# Patient Record
Sex: FEMALE | Race: WHITE | NOT HISPANIC OR LATINO | URBAN - METROPOLITAN AREA
[De-identification: names, ages, dates, MRNs, and addresses within clinical notes are randomized per-mention and may not be internally consistent; named-entity substitution may affect disease eponyms.]

---

## 2023-02-09 ENCOUNTER — OFFICE VISIT (OUTPATIENT)
Dept: FAMILY MEDICINE CLINIC | Facility: CLINIC | Age: 63
End: 2023-02-09

## 2023-02-09 ENCOUNTER — TELEPHONE (OUTPATIENT)
Dept: FAMILY MEDICINE CLINIC | Facility: CLINIC | Age: 63
End: 2023-02-09

## 2023-02-09 VITALS
TEMPERATURE: 97.9 F | SYSTOLIC BLOOD PRESSURE: 130 MMHG | DIASTOLIC BLOOD PRESSURE: 72 MMHG | WEIGHT: 122 LBS | HEIGHT: 61 IN | HEART RATE: 99 BPM | OXYGEN SATURATION: 98 % | BODY MASS INDEX: 23.03 KG/M2

## 2023-02-09 DIAGNOSIS — M79.672 ACUTE PAIN OF LEFT FOOT: Primary | ICD-10-CM

## 2023-02-09 DIAGNOSIS — Z74.8 ASSISTANCE NEEDED WITH TRANSPORTATION: ICD-10-CM

## 2023-02-09 DIAGNOSIS — M25.469 SUPRAPATELLAR EFFUSION OF KNEE: ICD-10-CM

## 2023-02-09 DIAGNOSIS — G89.4 CHRONIC PAIN SYNDROME: ICD-10-CM

## 2023-02-09 DIAGNOSIS — M79.89 SWELLING OF LEFT FOOT: ICD-10-CM

## 2023-02-09 DIAGNOSIS — M25.561 ACUTE PAIN OF RIGHT KNEE: ICD-10-CM

## 2023-02-09 DIAGNOSIS — M51.36 DDD (DEGENERATIVE DISC DISEASE), LUMBAR: ICD-10-CM

## 2023-02-09 PROBLEM — M51.369 DDD (DEGENERATIVE DISC DISEASE), LUMBAR: Status: ACTIVE | Noted: 2023-02-09

## 2023-02-09 PROBLEM — G89.29 CHRONIC PAIN: Status: ACTIVE | Noted: 2023-02-09

## 2023-02-09 RX ORDER — MORPHINE SULFATE 30 MG/1
30 TABLET, FILM COATED, EXTENDED RELEASE ORAL EVERY 12 HOURS
COMMUNITY
Start: 2023-01-28

## 2023-02-09 RX ORDER — SENNOSIDES 8.6 MG
1 CAPSULE ORAL EVERY 6 HOURS
COMMUNITY
Start: 2022-11-10

## 2023-02-09 RX ORDER — OMEPRAZOLE 20 MG/1
1 CAPSULE, DELAYED RELEASE ORAL DAILY
COMMUNITY
Start: 2022-11-10

## 2023-02-09 NOTE — PROGRESS NOTES
[facility] PRIMARY CARE    NAME: Maeve Reyes  AGE: 58 y o  SEX: female  : 1960     DATE: 2023     Assessment and Plan:     Problem List Items Addressed This Visit    None  Visit Diagnoses     Annual physical exam    -  Primary          Immunizations and preventive care screenings were discussed with patient today  Appropriate education was printed on patient's after visit summary  Counseling:  {Annual Physical; Counselin}         No follow-ups on file  Chief Complaint:     Chief Complaint   Patient presents with   • Establish Care     Patient is in the office to Establish Car with Dr Jennifer Sow  Last PCP Dr Gomez Delaney still has different PCP listed on [employer] insurance - was not ever available for contact ahead of appmt to notify  Stated Concerns - "my legs are bothering me" Pt feels pain, especially in her left foot, states that L foot and lower leg is swollen and the front lower part of foot does not flex  R leg is similar but R knee "does not flex"  Pt demonstrated ROM  Pt went tp [address]  History of Present Illness:     Adult Annual Physical   Patient is a 58year old female who presents today as a new patient a comprehensive physical exam     Her previous PCP= Dr Emery Ochoa  There are no records for review at time of this visit  Patient speaks [place] as her primary language  She came alone to today's visit   services offered  The patient reports pain and swelling in both of her legs  She states that she was hospitalized at [facility] recently for this same issue  She is not certain as to what the diagnosis was  She has been on disability for 15 years for   She follows with Dr Mala Richardson (?sp) at [facility]       Colon cancer screen  Cervical cancer screen   Mammogram    There is no immunization history on file for this patient  She declines flu vaccine, pneumococcal vaccine, shingles vaccine and covid 19 vaccine  Diet and Physical Activity  Diet/Nutrition: {annual physical; diet:}  Exercise: {annual physical; exercise:2102}  Depression Screening  PHQ-2/9 Depression Screening    Little interest or pleasure in doing things: 0 - not at all  Feeling down, depressed, or hopeless: 0 - not at all  PHQ-2 Score: 0  PHQ-2 Interpretation: Negative depression screen       General Health  Sleep: {annual physical; sleep:2102}  Hearing: {annual physical; hearin}  Vision: {annual physical; vision:}  Dental: {annual physical; dental:}  /GYN Health  Patient is: {Menopause:21323}  Last menstrual period: ***  Contraceptive method: {contraceptive options:}  Review of Systems:     Review of Systems   Past Medical History:     History reviewed  No pertinent past medical history  Past Surgical History:     History reviewed  No pertinent surgical history     Social History:     Social History     Socioeconomic History   • Marital status:      Spouse name: None   • Number of children: None   • Years of education: None   • Highest education level: None   Occupational History   • None   Tobacco Use   • Smoking status: Every Day     Packs/day: 0 50     Years: 40 00     Pack years: 20 00     Types: Cigarettes     Start date: 1980   • Smokeless tobacco: Never   Vaping Use   • Vaping Use: Never used   Substance and Sexual Activity   • Alcohol use: Never   • Drug use: Never   • Sexual activity: Not Currently   Other Topics Concern   • None   Social History Narrative   • None     Social Determinants of Health     Financial Resource Strain: Not on file   Food Insecurity: Not on file   Transportation Needs: Not on file   Physical Activity: Not on file   Stress: Not on file   Social Connections: Not on file   Intimate Partner Violence: Not on file   Housing Stability: Not on file      Family History:     Family History   Problem Relation Age of Onset   • Gout Father         Passsed away at 80, had gout, but only in old age  Current Medications:     Current Outpatient Medications   Medication Sig Dispense Refill   • acetaminophen (TYLENOL) 650 mg CR tablet Take 1 tablet by mouth every 6 (six) hours     • morphine (MS CONTIN) 30 mg 12 hr tablet Take 30 mg by mouth every 12 (twelve) hours     • omeprazole (PriLOSEC) 20 mg delayed release capsule Take 1 capsule by mouth in the morning       No current facility-administered medications for this visit        Allergies:     No Known Allergies   Physical Exam:     /72 (BP Location: Left arm, Patient Position: Sitting, Cuff Size: Standard)   Pulse 99   Temp 97 9 °F (36 6 °C) (Tympanic)   Ht 5' 1" (1 549 m)   Wt 55 3 kg (122 lb)   LMP  (LMP Unknown)   SpO2 98%   BMI 23 05 kg/m²     Physical Exam     Arturo Perry DO  [address]

## 2023-02-09 NOTE — PROGRESS NOTES
Name: Linda Sheppard      : 1960      MRN: [id number]  Encounter Provider: Loyd June DO  Encounter Date: 2023   Encounter department: [address] [place] [address]  Acute pain of left foot  Xray of left foot in hospital showed only plantar calcaneal spur otherwise negative for fracture  Still with pain and swelling of this foot  ? Rheumatologic etiology  Patient was referred at time of discharge to see rheumatology  We tried calling the rheumatologist office--Dr sexton --and they do not accept [employer] insurance  Will put referral in for [facility] rheum and ortho but unclear whether she will be able to find transportation for viist    She cannot drive nor does she have family nearby that can drive her  Referral to social work placed  2  DDD (degenerative disc disease), lumbar  Chronic pain in low back for which she follows with neurology and is on MS contin for this  3  Chronic pain syndrome    4  Acute pain of right knee  -     C-reactive protein; Future  -     CBC and differential; Future  -     Cyclic citrul peptide antibody, IgG; Future  -     DIAMOND Screen w/ Reflex to Titer/Pattern; Future  -     Lyme Antibody Profile with reflex to WB; Future  -     Uric acid; Future  -     Comprehensive metabolic panel; Future  -     C-reactive protein  -     CBC and differential  -     Cyclic citrul peptide antibody, IgG  -     DIAMOND Screen w/ Reflex to Titer/Pattern  -     Lyme Antibody Profile with reflex to WB  -     Uric acid  -     Comprehensive metabolic panel  Reviewed her hospital discharge records that were given to patient which included labs, ultrasound guided aspiration report, and discharge report  No actual imaging reports were available for my review  Will check additional labs  Needs to see ortho for this gastroc fluid collection and associated acute pain/swelling of right knee   May possibly need to see rheum for this but may have difficulty getting appt for this patient who cannot drive and has insurance that local independent rheumatologist does not accept  I put referrals in for [facility] and and rheum and for social work since she  does not drive  Does not appear to be cellulitis on exam as there is no erythema/warmth of either extremity though I did advised that if she develops worsening pain, erythema or warmth, should call/proceed to ED  She is agreeable  5  Suprapatellar effusion of knee  -     C-reactive protein; Future  -     CBC and differential; Future  -     Cyclic citrul peptide antibody, IgG; Future  -     DIAMOND Screen w/ Reflex to Titer/Pattern; Future  -     Lyme Antibody Profile with reflex to WB; Future  -     Uric acid; Future  -     Comprehensive metabolic panel; Future  -     C-reactive protein  -     CBC and differential  -     Cyclic citrul peptide antibody, IgG  -     DIAMOND Screen w/ Reflex to Titer/Pattern  -     Lyme Antibody Profile with reflex to WB  -     Uric acid  -     Comprehensive metabolic panel    6  Swelling of left foot  -     C-reactive protein; Future  -     CBC and differential; Future  -     Cyclic citrul peptide antibody, IgG; Future  -     DIAMOND Screen w/ Reflex to Titer/Pattern; Future  -     Lyme Antibody Profile with reflex to WB; Future  -     Uric acid; Future  -     Comprehensive metabolic panel; Future  -     C-reactive protein  -     CBC and differential  -     Cyclic citrul peptide antibody, IgG  -     DIAMOND Screen w/ Reflex to Titer/Pattern  -     Lyme Antibody Profile with reflex to WB  -     Uric acid  -     Comprehensive metabolic panel           Subjective     HPI   Patient is a 58year old female who presents today as a new patient to establish care/followup from recent hospital admission  Her previous PCP= Dr Elle Toribio  There are no records from his office for review at time of this visit  She thinks that the last time she was there was 2 years ago       Patient speaks [place] as her primary language  She came alone to today's visit   services offered  She declines  The patient reports pain and swelling in both feet and legs for the last month  Initially was in just the left foot, but now has spread to involve the right foot  She states that she was seen at [facility] initially, diagnosed with gout, given NSAIDS, and sent home  Then went back to [facility] and was admitted  She brought her discharge paperwork along which I reviewed  HPI notes that she came in with RIGHT lower extremity pain and redness for 2 days  Was then diagnosed with cellulitis  Venous duplex was negative for DVT  She had CT scan which showed moderate suprapatellar effusion  And fluid collection in medial gastroc muscle  Right knee was aspirated by ortho on 1/19/23 and cultures showed no growth  Blood cultures were negative  She also had xray of left foot  Xray negative for fracture  She was started on prednisone during admission with reported improvement in her pain  Discharged on doxy and prednisone and advised to f/u with rheumatologist  She reports that she felt much better while on steroids  Now has finished both and still having pain and swelling in both feet, left foot greater than right  Her right knee is also still bothering her  She also states that she has been running low grade fevers  CBC on 1/22/23 showed normal WBC at 7 3K  her H/H were low at 10 6/33 8 respectively  Platelets were elevated at 446K  CMP on 1/22/23 showed low sodium at 135, elevated glucose at 150  BUN and creatinine were normal    Her uric acid level on 1/18/23 was normal at 3 6  CRP elevated 17 9K  Aspiration of knee showed cloudy synovial fluid with 10,850 WBC and no crystals  Cultures of this aspirate were negative for infection  She has been on disability for 15 years for for "herniated disc pain in her back"   She follows with a neurologist Dr Remberto Kaur (?sp) at [facility]  Being prescribed MS contin  Review of Systems    Past Medical History:   Diagnosis Date   • Chronic low back pain    • DDD (degenerative disc disease), lumbar    • Perforated peptic ulcer ([address] )      Past Surgical History:   Procedure Laterality Date   • ABDOMINAL SURGERY  2004    for perforated peptic ulcer     Family History   Problem Relation Age of Onset   • Gout Father         Passsed away at 80, had gout, but only in old age  Social History     Socioeconomic History   • Marital status:      Spouse name: None   • Number of children: None   • Years of education: None   • Highest education level: None   Occupational History   • None   Tobacco Use   • Smoking status: Every Day     Packs/day: 0 50     Years: 40 00     Pack years: 20 00     Types: Cigarettes     Start date: 1/1/1980   • Smokeless tobacco: Never   Vaping Use   • Vaping Use: Never used   Substance and Sexual Activity   • Alcohol use: Never   • Drug use: Never   • Sexual activity: Not Currently   Other Topics Concern   • None   Social History Narrative    Recently moved to [place] [employer] from [place]    2 daughters, one in [place] and one in [place]    Lives alone with her cat     Social Determinants of Health     Financial Resource Strain: Not on file   Food Insecurity: Not on file   Transportation Needs: Not on file   Physical Activity: Not on file   Stress: Not on file   Social Connections: Not on file   Intimate Partner Violence: Not on file   Housing Stability: Not on file     Current Outpatient Medications on File Prior to Visit   Medication Sig   • acetaminophen (TYLENOL) 650 mg CR tablet Take 1 tablet by mouth every 6 (six) hours   • morphine (MS CONTIN) 30 mg 12 hr tablet Take 30 mg by mouth every 12 (twelve) hours   • omeprazole (PriLOSEC) 20 mg delayed release capsule Take 1 capsule by mouth in the morning     No Known Allergies    There is no immunization history on file for this patient      Objective     /72 (BP Location: Left arm, Patient Position: Sitting, Cuff Size: Standard)   Pulse 99   Temp 97 9 °F (36 6 °C) (Tympanic)   Ht 5' 1" (1 549 m)   Wt 55 3 kg (122 lb)   LMP  (LMP Unknown)   SpO2 98%   BMI 23 05 kg/m²     Physical Exam  Vitals and nursing note reviewed  Constitutional:       General: She is not in acute distress  Appearance: Normal appearance  She is not ill-appearing, toxic-appearing or diaphoretic  Comments: Ambulates with walker  HENT:      Head: Normocephalic and atraumatic  Mouth/Throat:      Mouth: Mucous membranes are moist       Pharynx: No oropharyngeal exudate or posterior oropharyngeal erythema  Eyes:      Conjunctiva/sclera: Conjunctivae normal    Cardiovascular:      Rate and Rhythm: Normal rate and regular rhythm  Pulses: Normal pulses  Heart sounds: No murmur heard  Comments: Dorsalis pedis pulses 2/4 bilateral lower extremities  Pulmonary:      Effort: Pulmonary effort is normal       Breath sounds: Normal breath sounds  Abdominal:      General: Abdomen is flat  Bowel sounds are normal  There is no distension  Palpations: Abdomen is soft  There is no mass  Tenderness: There is no abdominal tenderness  Musculoskeletal:      Cervical back: Normal range of motion and neck supple  Comments: Bilateral pedal edema  Negative Bhavya's bilaterally  Right knee with effusion and inability to actively flex knee   Lymphadenopathy:      Cervical: No cervical adenopathy  Skin:     General: Skin is warm and dry  Findings: No lesion or rash  Comments: Feet warm, dry with no open wounds  She has edema bilateral feet   Neurological:      General: No focal deficit present  Mental Status: She is alert     Psychiatric:         Mood and Affect: Mood normal        Oral Vanessa DO

## 2023-02-09 NOTE — PATIENT INSTRUCTIONS

## 2023-02-09 NOTE — TELEPHONE ENCOUNTER
PCP on [facility], patient stated that her daughter Efrain Cruz is aware to change the PCP on insurance [employer])  Patient stated that she is sure that her daughter knows, because her daughter stated this fact to her  I spoke with the patient's daughter 2/9/2023 @ 4:07PM and she stated that she called [facility] and they stated that as of today, the PCP is updated to Dr Nabil Emmanuel

## 2023-02-14 ENCOUNTER — TELEPHONE (OUTPATIENT)
Dept: FAMILY MEDICINE CLINIC | Facility: CLINIC | Age: 63
End: 2023-02-14

## 2023-02-14 NOTE — TELEPHONE ENCOUNTER
Neurology -     Patient called in with daughter on 3-way to have daughter assist her with translation  Daughter and patient received clarification on numerous OV instructions from the first OV, inclusive of lab testing and referrals  Patient and patient's daughter also agreed that they think that staying with Dr Bhavya Graves of [address] [place] [address] and [facility] is a best option, as patient has followed for 16 years  Patient and patient's daughter aware to call back with any further requests for clarification

## 2023-02-15 ENCOUNTER — HOSPITAL ENCOUNTER (OUTPATIENT)
Dept: RADIOLOGY | Facility: HOSPITAL | Age: 63
Discharge: HOME | End: 2023-02-15

## 2023-02-15 DIAGNOSIS — M54.10 RADICULOPATHY, UNSPECIFIED SPINAL REGION: ICD-10-CM

## 2023-02-15 DIAGNOSIS — G89.29 OTHER CHRONIC PAIN: ICD-10-CM

## 2023-02-17 ENCOUNTER — TELEPHONE (OUTPATIENT)
Dept: FAMILY MEDICINE CLINIC | Facility: CLINIC | Age: 63
End: 2023-02-17

## 2023-02-17 ENCOUNTER — TELEPHONE (OUTPATIENT)
Dept: OBGYN CLINIC | Facility: HOSPITAL | Age: 63
End: 2023-02-17

## 2023-02-17 LAB
ALBUMIN SERPL-MCNC: 3.9 G/DL (ref 3.6–5.1)
ALBUMIN/GLOB SERPL: 1.3 (CALC) (ref 1–2.5)
ALP SERPL-CCNC: 71 U/L (ref 37–153)
ALT SERPL-CCNC: 16 U/L (ref 6–29)
AST SERPL-CCNC: 19 U/L (ref 10–35)
B BURGDOR AB SER IA-ACNC: <0.9 INDEX
BASOPHILS # BLD AUTO: 22 CELLS/UL (ref 0–200)
BASOPHILS NFR BLD AUTO: 0.4 %
BILIRUB SERPL-MCNC: 0.3 MG/DL (ref 0.2–1.2)
BUN SERPL-MCNC: 13 MG/DL (ref 7–25)
BUN/CREAT SERPL: ABNORMAL (CALC) (ref 6–22)
CALCIUM SERPL-MCNC: 9.8 MG/DL (ref 8.6–10.4)
CCP IGG SERPL-ACNC: <16 UNITS
CHLORIDE SERPL-SCNC: 103 MMOL/L (ref 98–110)
CO2 SERPL-SCNC: 29 MMOL/L (ref 20–32)
CREAT SERPL-MCNC: 0.66 MG/DL (ref 0.5–1.05)
CRP SERPL-MCNC: 30.2 MG/L
EOSINOPHIL # BLD AUTO: 157 CELLS/UL (ref 15–500)
EOSINOPHIL NFR BLD AUTO: 2.8 %
ERYTHROCYTE [DISTWIDTH] IN BLOOD BY AUTOMATED COUNT: 12.1 % (ref 11–15)
GFR/BSA.PRED SERPLBLD CYS-BASED-ARV: 99 ML/MIN/1.73M2
GLOBULIN SER CALC-MCNC: 2.9 G/DL (CALC) (ref 1.9–3.7)
GLUCOSE SERPL-MCNC: 105 MG/DL (ref 65–99)
HCT VFR BLD AUTO: 36.5 % (ref 35–45)
HGB BLD-MCNC: 11.7 G/DL (ref 11.7–15.5)
LYMPHOCYTES # BLD AUTO: 1764 CELLS/UL (ref 850–3900)
LYMPHOCYTES NFR BLD AUTO: 31.5 %
MCH RBC QN AUTO: 28.3 PG (ref 27–33)
MCHC RBC AUTO-ENTMCNC: 32.1 G/DL (ref 32–36)
MCV RBC AUTO: 88.4 FL (ref 80–100)
MONOCYTES # BLD AUTO: 510 CELLS/UL (ref 200–950)
MONOCYTES NFR BLD AUTO: 9.1 %
NEUTROPHILS # BLD AUTO: 3147 CELLS/UL (ref 1500–7800)
NEUTROPHILS NFR BLD AUTO: 56.2 %
PLATELET # BLD AUTO: 496 THOUSAND/UL (ref 140–400)
PMV BLD REES-ECKER: 9.8 FL (ref 7.5–12.5)
POTASSIUM SERPL-SCNC: 4.4 MMOL/L (ref 3.5–5.3)
PROT SERPL-MCNC: 6.8 G/DL (ref 6.1–8.1)
RBC # BLD AUTO: 4.13 MILLION/UL (ref 3.8–5.1)
SODIUM SERPL-SCNC: 141 MMOL/L (ref 135–146)
URATE SERPL-MCNC: 3 MG/DL (ref 2.5–7)
WBC # BLD AUTO: 5.6 THOUSAND/UL (ref 3.8–10.8)

## 2023-02-17 NOTE — TELEPHONE ENCOUNTER
Caller: Patient's daughter    Doctor: Jensen    Reason for call: Patient's daughter calling to schedule with rheum  First avail Dec   Daughter will [facility] back to PCP for suggestions      Call back#: [phone]

## 2023-02-20 ENCOUNTER — TELEPHONE (OUTPATIENT)
Dept: FAMILY MEDICINE CLINIC | Facility: CLINIC | Age: 63
End: 2023-02-20

## 2023-02-20 LAB
ALBUMIN SERPL-MCNC: 3.9 G/DL (ref 3.6–5.1)
ALBUMIN/GLOB SERPL: 1.3 (CALC) (ref 1–2.5)
ALP SERPL-CCNC: 71 U/L (ref 37–153)
ALT SERPL-CCNC: 16 U/L (ref 6–29)
ANA SER QL IF: NEGATIVE
AST SERPL-CCNC: 19 U/L (ref 10–35)
B BURGDOR AB SER IA-ACNC: <0.9 INDEX
BASOPHILS # BLD AUTO: 22 CELLS/UL (ref 0–200)
BASOPHILS NFR BLD AUTO: 0.4 %
BILIRUB SERPL-MCNC: 0.3 MG/DL (ref 0.2–1.2)
BUN SERPL-MCNC: 13 MG/DL (ref 7–25)
BUN/CREAT SERPL: ABNORMAL (CALC) (ref 6–22)
CALCIUM SERPL-MCNC: 9.8 MG/DL (ref 8.6–10.4)
CCP IGG SERPL-ACNC: <16 UNITS
CHLORIDE SERPL-SCNC: 103 MMOL/L (ref 98–110)
CO2 SERPL-SCNC: 29 MMOL/L (ref 20–32)
CREAT SERPL-MCNC: 0.66 MG/DL (ref 0.5–1.05)
CRP SERPL-MCNC: 30.2 MG/L
EOSINOPHIL # BLD AUTO: 157 CELLS/UL (ref 15–500)
EOSINOPHIL NFR BLD AUTO: 2.8 %
ERYTHROCYTE [DISTWIDTH] IN BLOOD BY AUTOMATED COUNT: 12.1 % (ref 11–15)
GFR/BSA.PRED SERPLBLD CYS-BASED-ARV: 99 ML/MIN/1.73M2
GLOBULIN SER CALC-MCNC: 2.9 G/DL (CALC) (ref 1.9–3.7)
GLUCOSE SERPL-MCNC: 105 MG/DL (ref 65–99)
HCT VFR BLD AUTO: 36.5 % (ref 35–45)
HGB BLD-MCNC: 11.7 G/DL (ref 11.7–15.5)
LYMPHOCYTES # BLD AUTO: 1764 CELLS/UL (ref 850–3900)
LYMPHOCYTES NFR BLD AUTO: 31.5 %
MCH RBC QN AUTO: 28.3 PG (ref 27–33)
MCHC RBC AUTO-ENTMCNC: 32.1 G/DL (ref 32–36)
MCV RBC AUTO: 88.4 FL (ref 80–100)
MONOCYTES # BLD AUTO: 510 CELLS/UL (ref 200–950)
MONOCYTES NFR BLD AUTO: 9.1 %
NEUTROPHILS # BLD AUTO: 3147 CELLS/UL (ref 1500–7800)
NEUTROPHILS NFR BLD AUTO: 56.2 %
PLATELET # BLD AUTO: 496 THOUSAND/UL (ref 140–400)
PMV BLD REES-ECKER: 9.8 FL (ref 7.5–12.5)
POTASSIUM SERPL-SCNC: 4.4 MMOL/L (ref 3.5–5.3)
PROT SERPL-MCNC: 6.8 G/DL (ref 6.1–8.1)
RBC # BLD AUTO: 4.13 MILLION/UL (ref 3.8–5.1)
SODIUM SERPL-SCNC: 141 MMOL/L (ref 135–146)
URATE SERPL-MCNC: 3 MG/DL (ref 2.5–7)
WBC # BLD AUTO: 5.6 THOUSAND/UL (ref 3.8–10.8)

## 2023-02-20 NOTE — TELEPHONE ENCOUNTER
----- Message from Hermelindo Jimenez DO sent at 2/20/2023  1:31 PM EST -----  Please call patient to let her know that the testing for both rheumatoid arthritis and lupus were negative  Did she have any success with scheduling her rheumatology appointment?

## 2023-02-20 NOTE — TELEPHONE ENCOUNTER
Patient aware of results  Per patient next availability with rheumatology not until December  Patient stated daughter will contact patient insurance to facilitate other rheumatologies in the are the accept patient insurance  Patient will call office if any further questions

## 2023-02-21 DIAGNOSIS — M79.672 ACUTE PAIN OF LEFT FOOT: ICD-10-CM

## 2023-02-21 DIAGNOSIS — M79.89 SWELLING OF LEFT FOOT: Primary | ICD-10-CM

## 2023-02-21 DIAGNOSIS — M25.469 SUPRAPATELLAR EFFUSION OF KNEE: ICD-10-CM

## 2023-02-22 ENCOUNTER — PATIENT OUTREACH (OUTPATIENT)
Dept: FAMILY MEDICINE CLINIC | Facility: CLINIC | Age: 63
End: 2023-02-22

## 2023-02-22 NOTE — TELEPHONE ENCOUNTER
Patient's daughter will be calling Ortho back to schedule the patient  Ortho's phone number was provided to the patient's daughter

## 2023-02-22 NOTE — TELEPHONE ENCOUNTER
Patient's daughter called in stating that she tried three locations and their next availability is in December  Two more [facility]'s rheumatologist numbers were provided, she would be checking the availability in those two facilities

## 2023-02-22 NOTE — PROGRESS NOTES
DAMARIS ALLEN received a referral from patient's PCP to assist with transportation resources as well as obtaining a rhumatology appointment  DAMARIS ALLEN reviewed patient's chart and called patient ([phone])  Patient did not answer and does not have a voicemail box set up  DAMARIS ALLEN unable to leave a message  DAMARIS ALLEN will attempt to outreach again at a later date

## 2023-02-23 ENCOUNTER — OFFICE VISIT (OUTPATIENT)
Dept: OBGYN CLINIC | Facility: CLINIC | Age: 63
End: 2023-02-23

## 2023-02-23 ENCOUNTER — APPOINTMENT (OUTPATIENT)
Dept: RADIOLOGY | Facility: CLINIC | Age: 63
End: 2023-02-23

## 2023-02-23 VITALS
WEIGHT: 122 LBS | DIASTOLIC BLOOD PRESSURE: 68 MMHG | HEIGHT: 61 IN | SYSTOLIC BLOOD PRESSURE: 118 MMHG | BODY MASS INDEX: 23.03 KG/M2

## 2023-02-23 DIAGNOSIS — M25.561 RIGHT KNEE PAIN, UNSPECIFIED CHRONICITY: ICD-10-CM

## 2023-02-23 DIAGNOSIS — M25.572 LEFT ANKLE PAIN, UNSPECIFIED CHRONICITY: ICD-10-CM

## 2023-02-23 DIAGNOSIS — M25.461 EFFUSION OF RIGHT KNEE: ICD-10-CM

## 2023-02-23 DIAGNOSIS — M25.469 SUPRAPATELLAR EFFUSION OF KNEE: ICD-10-CM

## 2023-02-23 DIAGNOSIS — M25.572 CHRONIC PAIN OF LEFT ANKLE: ICD-10-CM

## 2023-02-23 DIAGNOSIS — M79.672 PAIN IN LEFT FOOT: ICD-10-CM

## 2023-02-23 DIAGNOSIS — M17.11 PRIMARY OSTEOARTHRITIS OF RIGHT KNEE: Primary | ICD-10-CM

## 2023-02-23 DIAGNOSIS — G89.29 CHRONIC PAIN OF LEFT ANKLE: ICD-10-CM

## 2023-02-23 DIAGNOSIS — R22.42 LOCALIZED SWELLING OF LEFT FOOT: ICD-10-CM

## 2023-02-23 DIAGNOSIS — M19.072 ARTHRITIS OF LEFT FOOT: ICD-10-CM

## 2023-02-23 RX ORDER — COLCHICINE 0.6 MG/1
0.6 TABLET ORAL DAILY
Qty: 5 TABLET | Refills: 0 | Status: SHIPPED | OUTPATIENT
Start: 2023-02-23 | End: 2023-02-28

## 2023-02-23 RX ORDER — DICLOFENAC SODIUM 75 MG/1
75 TABLET, DELAYED RELEASE ORAL 2 TIMES DAILY
Qty: 28 TABLET | Refills: 0 | Status: SHIPPED | OUTPATIENT
Start: 2023-02-23

## 2023-02-23 NOTE — PROGRESS NOTES
Assessment:     1  Primary osteoarthritis of right knee    2  Arthritis of left foot    3  Effusion of right knee    4  Suprapatellar effusion of knee    5  Localized swelling of left foot    6  Chronic pain of left ankle        Plan:     Problem List Items Addressed This Visit        Musculoskeletal and Integument    Arthritis of left foot    Relevant Orders    XR foot 3+ vw left    Cam Boot    Cam Boot    Primary osteoarthritis of right knee - Primary    Relevant Medications    diclofenac (VOLTAREN) 75 mg EC tablet    Other Relevant Orders    XR knee 4+ vw right injury   Other Visit Diagnoses     Effusion of right knee        Relevant Medications    diclofenac (VOLTAREN) 75 mg EC tablet    Suprapatellar effusion of knee        Localized swelling of left foot        Relevant Medications    colchicine (COLCRYS) 0 6 mg tablet    Chronic pain of left ankle        Relevant Orders    XR ankle 3+ vw left          Findings consistent with right knee mild to moderate arthritis and mild effusion, left foot swelling and pain  Imaging and prognosis reviewed with patient  I explained to patient that her right leg problem is most likely irritated her right knee arthritis from her abnormal gait pattern by favoring her left leg due to her foot problem  Her left foot pain and swelling may be related to midfoot arthritis versus unknown inflammatory process  She did have work-up for gout with aspiration of her right knee which was negative  She also have negative rheumatoid factor but does have elevated C-reactive protein  Her knee work-up also was negative for crystal or infection  Patient declined aspiration and cortisone injection in office as she is worried she will not be able to tolerate procedure  Swelling is causing restriction of motion in knee  Left foot imaging showed mid foot arthritis and 1st MTP joint arthritis  Not exactly sure of etiology of prolonged foot swelling   Patient placed in CAM boot to wear during ambulation  She will also be prescribed diclofenac 75 mg BID 2 weeks for anti inflammatory, colchicine 0 6mg 5 days for possible gout of left foot  Patient will make appt next week for right knee for possible aspiration and cortisone injection  I also encouraged her to follow-up with a rheumatologist for further work-up  She can continue to use walker to assist in ambulation  All patient's questions were answered to her satisfaction  This note is created using dictation transcription  It may contain typographical errors, grammatical errors, improperly dictated words, background noise and other errors  Subjective:     Patient ID: Isaiah Lizarraga is a 61 y o  female  Chief Complaint:  61 yr old female in for evaluation of right knee pain  Referred by PCP Dr Karlo Alba to evaluate right knee swelling, swelling into medial gastroc  Francheska Nicolasa Patient is using walker to ambulate with difficulty  She was recently hospitalized at [facility] for bilateral foot pain, possible gout, cellulitis in lower extremities and d/c on doxyclycline, taper prednisone for pain  Patient was also prescribed Zahra Ewa,, which did provide her with good pain relief  She was also having some right knee pain, swelling w/o injury or trauma  Right knee was aspirated by ortho on 1/19/23 and cultures showed no growth  Blood cultures were negative  No cortisone injection in right knee  She also had lab work lyme, uric acid, DIAMOND negative  When patient saw PCP last week no apparent signs of cellulitis  Denies any fever,chills  Patient is also concerned of left foot swelling and pain for past 2 months which she states started prior to right foot and knee pain  Information on patient's intake form was reviewed      Allergy:  No Known Allergies  Medications:  all current active meds have been reviewed  Past Medical History:  Past Medical History:   Diagnosis Date   • Chronic low back pain    • DDD (degenerative disc disease), lumbar    • Perforated peptic ulcer ([facility] [address] )      Past Surgical History:  Past Surgical History:   Procedure Laterality Date   • ABDOMINAL SURGERY  2004    for perforated peptic ulcer     Family History:  Family History   Problem Relation Age of Onset   • Gout Father         Passsed away at 80, had gout, but only in old age  Social History:  Social History     Substance and Sexual Activity   Alcohol Use Never     Social History     Substance and Sexual Activity   Drug Use Never     Social History     Tobacco Use   Smoking Status Every Day   • Packs/day: 0 50   • Years: 40 00   • Pack years: 20 00   • Types: Cigarettes   • Start date: 1/1/1980   Smokeless Tobacco Never     Review of Systems   Constitutional: Negative for chills and fever  HENT: Negative for ear pain and sore throat  Eyes: Negative for pain and visual disturbance  Respiratory: Negative for cough and shortness of breath  Cardiovascular: Negative for chest pain and palpitations  Gastrointestinal: Negative for abdominal pain and vomiting  Genitourinary: Negative for dysuria and hematuria  Musculoskeletal: Positive for arthralgias (Left foot), gait problem (Ambulate with a walker) and joint swelling (Left foot and right knee)  Negative for back pain  Skin: Negative for color change and rash  Neurological: Negative for seizures and syncope  Psychiatric/Behavioral: Negative  All other systems reviewed and are negative  Objective:  BP Readings from Last 1 Encounters:   02/23/23 118/68      Wt Readings from Last 1 Encounters:   02/23/23 55 3 kg (122 lb)      BMI:   Estimated body mass index is 23 05 kg/m² as calculated from the following:    Height as of this encounter: 5' 1" (1 549 m)  Weight as of this encounter: 55 3 kg (122 lb)  BSA:   Estimated body surface area is 1 53 meters squared as calculated from the following:    Height as of this encounter: 5' 1" (1 549 m)  Weight as of this encounter: 55 3 kg (122 lb)     Physical Exam  Vitals and nursing note reviewed  Constitutional:       Appearance: Normal appearance  She is well-developed  HENT:      Head: Normocephalic and atraumatic  Right Ear: External ear normal       Left Ear: External ear normal    Eyes:      Extraocular Movements: Extraocular movements intact  Conjunctiva/sclera: Conjunctivae normal    Pulmonary:      Effort: Pulmonary effort is normal    Musculoskeletal:         General: Tenderness (right knee arthralgia ) present  Cervical back: Neck supple  Right knee: Effusion (Trace) present  Instability Tests: Medial Fadumo test negative and lateral Fadumo test negative  Skin:     General: Skin is warm and dry  Neurological:      Mental Status: She is alert and oriented to person, place, and time  Deep Tendon Reflexes: Reflexes are normal and symmetric  Psychiatric:         Mood and Affect: Mood normal          Behavior: Behavior normal        Left Ankle Exam     Tenderness   Left ankle tenderness location: Anteriorly and diffusely in the foot  Swelling: mild    Range of Motion   Dorsiflexion: abnormal   Plantar flexion: abnormal   Eversion: abnormal   Inversion: abnormal     Tests   Anterior drawer: negative    Other   Erythema: absent  Scars: absent  Sensation: normal  Pulse: present    Comments:  Swelling of ankle joint into foot  Diffuse pain left foot to palpation  Bunion of great toe      Right Knee Exam     Muscle Strength   The patient has normal right knee strength  Tenderness   The patient is experiencing tenderness in the medial joint line and lateral joint line      Range of Motion   Extension: 0   Flexion: 90 (pain)     Tests   Fadumo:  Medial - negative Lateral - negative  Varus: negative Valgus: negative  Patellar apprehension: negative    Other   Erythema: absent  Scars: absent  Sensation: normal  Pulse: present  Swelling: mild  Effusion: effusion (Trace) present    Comments:  Crepitation with motion of patella             I have personally reviewed pertinent films in PACS and my interpretation is xr right knee demonstrates moderate medial osteoarthritis with spurring , xr left foot and ankle demonstrate no fracture, no mortise widening, mid foot arthritis, arthritis of 1st MTP joint, lab work negative for lyme, DIAMOND, uric acid       Scribe Attestation    I,:  Hillary Valdivia am acting as a scribe while in the presence of the attending physician :       I,:  Max Moeller MD personally performed the services described in this documentation    as scribed in my presence :

## 2023-02-23 NOTE — LETTER
February 23, 2023     Arline Mari, [address]  [facility]    Patient: Nessa Fajardo   YOB: 1960   Date of Visit: 2/23/2023       Dear Dr Mcdowell Laws:    Thank you for referring Nessa Fajardo to me for evaluation  Below are my notes for this consultation  If you have questions, please do not hesitate to call me  I look forward to following your patient along with you           Sincerely,        Myranda Fall MD        CC: No Recipients

## 2023-02-28 ENCOUNTER — E-CONSULT (OUTPATIENT)
Dept: RHEUMATOLOGY | Facility: CLINIC | Age: 63
End: 2023-02-28

## 2023-02-28 DIAGNOSIS — M19.072 ARTHRITIS OF LEFT FOOT: Primary | ICD-10-CM

## 2023-02-28 NOTE — PROGRESS NOTES
Patient already seeing Ortho  on 3/2/23  If they feel that the episode of arthritis warrants rheumatologic evaluation then they can place a formal rheumatology consult and patient can be seen for a face to face appointment  I would continue to treat the recurrent episodes of arthritis with steroid tapers should they recur  10 min  Reviewing chart

## 2023-03-02 ENCOUNTER — OFFICE VISIT (OUTPATIENT)
Dept: OBGYN CLINIC | Facility: CLINIC | Age: 63
End: 2023-03-02

## 2023-03-02 VITALS — SYSTOLIC BLOOD PRESSURE: 122 MMHG | DIASTOLIC BLOOD PRESSURE: 82 MMHG

## 2023-03-02 DIAGNOSIS — M19.072 ARTHRITIS OF LEFT FOOT: ICD-10-CM

## 2023-03-02 DIAGNOSIS — M25.461 EFFUSION OF RIGHT KNEE: ICD-10-CM

## 2023-03-02 DIAGNOSIS — M17.11 PRIMARY OSTEOARTHRITIS OF RIGHT KNEE: Primary | ICD-10-CM

## 2023-03-02 NOTE — PROGRESS NOTES
Assessment:     1  Primary osteoarthritis of right knee    2  Effusion of right knee    3  Arthritis of left foot        Plan:     Problem List Items Addressed This Visit        Musculoskeletal and Integument    Arthritis of left foot    Primary osteoarthritis of right knee - Primary   Other Visit Diagnoses     Effusion of right knee              Findings consistent with right knee arthritis and effusion, left midfoot arthritis, possible gout resolved  Discussed with patient she may have had gout in left foot which was causing severe pain and swelling  Since on colchicine these issues have resolved  She can wean out of CAM boot over next week or two  Right knee she declined cortisone injection today since she really has no pain and has regained motion  She was given script for diclofenac 50 BID 2 week course to titrate down from diclofenac 75 mg  Stationary bike, elliptical for low impact exercise  See patient back in 6 weeks  If no pain she can cancel her appt  all patient's questions were answered to her satisfaction  This note is created using dictation transcription  It may contain typographical errors, grammatical errors, improperly dictated words, background noise and other errors  Subjective:     Patient ID: Basilio Orellana is a 61 y o  female  Chief Complaint:  61 yr old female in for follow up regarding her right knee mild to moderate arthritis, effusion  She declined aspiration and CSI last week  She is wearing CAM boot for left mid foot arthritis  We reviewed at last appt that her right leg problem is most likely irritated her right knee arthritis from her abnormal gait pattern by favoring her left leg due to her foot problem  We did supple colchicine for possible gout of left foot at last appt along with diclofenac 75 mg  She states the pain in knee has greatly improved but still has some swelling  Her left foot redness and swelling has also improved   She is walking much better, she can move all her toes  She is only using CAM boot for longer walks  She denies fever, chill, or sweat  Allergy:  No Known Allergies  Medications:  all current active meds have been reviewed  Past Medical History:  Past Medical History:   Diagnosis Date   • Chronic low back pain    • DDD (degenerative disc disease), lumbar    • Perforated peptic ulcer ([address] )      Past Surgical History:  Past Surgical History:   Procedure Laterality Date   • ABDOMINAL SURGERY  2004    for perforated peptic ulcer     Family History:  Family History   Problem Relation Age of Onset   • Gout Father         Passsed away at 80, had gout, but only in old age  Social History:  Social History     Substance and Sexual Activity   Alcohol Use Never     Social History     Substance and Sexual Activity   Drug Use Never     Social History     Tobacco Use   Smoking Status Every Day   • Packs/day: 0 50   • Years: 40 00   • Pack years: 20 00   • Types: Cigarettes   • Start date: 1/1/1980   Smokeless Tobacco Never     Review of Systems   Constitutional: Negative for chills and fever  HENT: Negative for ear pain and sore throat  Eyes: Negative for pain and visual disturbance  Respiratory: Negative for cough and shortness of breath  Cardiovascular: Negative for chest pain and palpitations  Gastrointestinal: Negative for abdominal pain and vomiting  Genitourinary: Negative for dysuria and hematuria  Musculoskeletal: Positive for gait problem (Ambulate with a cam walker on the left)  Negative for arthralgias (right knee), back pain and joint swelling  Skin: Negative for color change and rash  Neurological: Negative for seizures and syncope  Psychiatric/Behavioral: Negative  All other systems reviewed and are negative          Objective:  BP Readings from Last 1 Encounters:   03/02/23 122/82      Wt Readings from Last 1 Encounters:   02/23/23 55 3 kg (122 lb)      BMI:   Estimated body mass index is 23 05 kg/m² as calculated from the following:    Height as of 2/23/23: 5' 1" (1 549 m)  Weight as of 2/23/23: 55 3 kg (122 lb)  BSA:   Estimated body surface area is 1 53 meters squared as calculated from the following:    Height as of 2/23/23: 5' 1" (1 549 m)  Weight as of 2/23/23: 55 3 kg (122 lb)  Physical Exam  Vitals and nursing note reviewed  Constitutional:       Appearance: Normal appearance  She is well-developed  HENT:      Head: Normocephalic and atraumatic  Right Ear: External ear normal       Left Ear: External ear normal    Eyes:      Extraocular Movements: Extraocular movements intact  Conjunctiva/sclera: Conjunctivae normal    Pulmonary:      Effort: Pulmonary effort is normal    Musculoskeletal:      Cervical back: Neck supple  Right knee: Effusion (Trace) present  Instability Tests: Medial Fadumo test negative and lateral Fadumo test negative  Skin:     General: Skin is warm and dry  Neurological:      Mental Status: She is alert and oriented to person, place, and time  Deep Tendon Reflexes: Reflexes are normal and symmetric  Psychiatric:         Mood and Affect: Mood normal          Behavior: Behavior normal        Left Ankle Exam     Tenderness   The patient is experiencing no tenderness  Swelling: mild (Foot)    Range of Motion   The patient has normal left ankle ROM  Muscle Strength   Dorsiflexion:  5/5   Plantar flexion:  5/5     Tests   Anterior drawer: negative    Other   Erythema: absent  Scars: absent  Sensation: normal  Pulse: present      Right Knee Exam     Muscle Strength   The patient has normal right knee strength  Tenderness   The patient is experiencing no tenderness       Range of Motion   Extension: normal   Flexion: normal     Tests   Fadumo:  Medial - negative Lateral - negative  Varus: negative Valgus: negative  Patellar apprehension: negative    Other   Erythema: absent  Scars: absent  Sensation: normal  Pulse: present  Swelling: mild  Effusion: effusion (Trace) present    Comments:  Mild crepitation with motion of patella             no imaging to review      Scribe Attestation    I,:  Pamella Leigh am acting as a scribe while in the presence of the attending physician :       I,:  Nigel Buck MD personally performed the services described in this documentation    as scribed in my presence :

## 2023-03-15 ENCOUNTER — PATIENT OUTREACH (OUTPATIENT)
Dept: FAMILY MEDICINE CLINIC | Facility: CLINIC | Age: 63
End: 2023-03-15

## 2023-03-15 NOTE — LETTER
[address] [facility]  [place] [address]    Re: Brett Stallings to Reach   3/15/2023       Dear Trang Lechuga am a [address] [facility] [address] [facility] Work  and wanted to be certain you had information to contact me should you desire assistance with or have questions about non-medical aspects of your care such as [but not limited to] medical insurance, housing, transportation, material needs, or emergency needs  If I do not have an answer I will assist you in finding the appropriate agency or individual who can help  Please feel free to contact me at [phone]  Thank You      Sincerely,         César Jennings, MSW,LSW

## 2023-03-15 NOTE — PROGRESS NOTES
DAMARIS ALLEN received a referral from patient's PCP to assist with transportation resources as well as obtaining a rhumatology appointment  DAMARIS ALLEN reviewed patient's chart and called patient ([phone]) a second time  Patient did not answer and does not have a voicemail box set up  DAMARIS ALLEN will place unable to reach letter in outgoing mail to patient  DAMARIS ALLEN will close, please re consult DAMARIS ALLEN as needed

## 2023-04-20 PROBLEM — M65.331 TRIGGER FINGER, RIGHT MIDDLE FINGER: Status: ACTIVE | Noted: 2023-04-20

## 2023-05-02 ENCOUNTER — OFFICE VISIT (OUTPATIENT)
Dept: OBGYN CLINIC | Facility: CLINIC | Age: 63
End: 2023-05-02

## 2023-05-02 VITALS
SYSTOLIC BLOOD PRESSURE: 120 MMHG | DIASTOLIC BLOOD PRESSURE: 80 MMHG | BODY MASS INDEX: 23.03 KG/M2 | HEIGHT: 61 IN | WEIGHT: 122 LBS

## 2023-05-02 DIAGNOSIS — M65.331 TRIGGER MIDDLE FINGER OF RIGHT HAND: Primary | ICD-10-CM

## 2023-05-02 NOTE — PROGRESS NOTES
Assessment:     1  Trigger middle finger of right hand        Plan:     Problem List Items Addressed This Visit        Musculoskeletal and Integument    Trigger middle finger of right hand - Primary     Findings consistent with right middle trigger finger  Findings and treatment options were discussed with the patient  Cortisone injection was given to the right middle A1 pulley today  She tolerated the procedure well  Advised to apply cold compress today  Discussed that if cortisone injection does not help, the neck step in treatment would be a trigger finger release surgery  She would have to wait at least 3 months from the cortisone injection to have the surgery  Follow-up as needed if symptoms return  All patient's questions were answered to her satisfaction  This note is created using dictation transcription  It may contain typographical errors, grammatical errors, improperly dictated words, background noise and other errors  Relevant Orders    Small joint arthrocentesis (Completed)      Subjective:     Patient ID: Richmond Lyon is a 61 y o  female  Chief Complaint: This is a 59-year-old female here for evaluation of her right middle finger  She states she developed locking and pain of the finger a few months ago  No injury  Symptoms have not improved in that time  She states locking can occur at any time  She has pain when flexing the finger  No treatment as of yet    No history of similar symptoms in the past     Allergy:  No Known Allergies  Medications:  all current active meds have been reviewed  Past Medical History:  Past Medical History:   Diagnosis Date    Chronic low back pain     DDD (degenerative disc disease), lumbar     Perforated peptic ulcer ([address] )      Past Surgical History:  Past Surgical History:   Procedure Laterality Date    ABDOMINAL SURGERY  2004    for perforated peptic ulcer     Family History:  Family History   Problem Relation Age of Onset    Gout "Father         Passsed away at 80, had gout, but only in old age  Social History:  Social History     Substance and Sexual Activity   Alcohol Use Never     Social History     Substance and Sexual Activity   Drug Use Never     Social History     Tobacco Use   Smoking Status Every Day    Packs/day: 0 50    Years: 40 00    Pack years: 20 00    Types: Cigarettes    Start date: 1/1/1980   Smokeless Tobacco Never     Review of Systems   Constitutional: Negative  HENT: Negative  Eyes: Negative  Respiratory: Negative  Cardiovascular: Negative  Gastrointestinal: Negative  Endocrine: Negative  Genitourinary: Negative  Musculoskeletal: Positive for arthralgias (Right middle finger)  Skin: Negative  Allergic/Immunologic: Negative  Hematological: Negative  Psychiatric/Behavioral: Negative  Objective:  BP Readings from Last 1 Encounters:   05/02/23 120/80      Wt Readings from Last 1 Encounters:   05/02/23 55 3 kg (122 lb)      BMI:   Estimated body mass index is 23 05 kg/m² as calculated from the following:    Height as of this encounter: 5' 1\" (1 549 m)  Weight as of this encounter: 55 3 kg (122 lb)  BSA:   Estimated body surface area is 1 53 meters squared as calculated from the following:    Height as of this encounter: 5' 1\" (1 549 m)  Weight as of this encounter: 55 3 kg (122 lb)  Physical Exam  Vitals and nursing note reviewed  Constitutional:       General: She is not in acute distress  Appearance: Normal appearance  She is well-developed  HENT:      Head: Normocephalic and atraumatic  Right Ear: External ear normal       Left Ear: External ear normal    Eyes:      Extraocular Movements: Extraocular movements intact  Conjunctiva/sclera: Conjunctivae normal    Pulmonary:      Effort: Pulmonary effort is normal  No respiratory distress  Musculoskeletal:      Cervical back: Neck supple  Skin:     General: Skin is warm and dry     Neurological: " General: No focal deficit present  Mental Status: She is alert and oriented to person, place, and time  Deep Tendon Reflexes: Reflexes are normal and symmetric  Psychiatric:         Mood and Affect: Mood normal          Behavior: Behavior normal        Right Hand Exam     Tenderness   Right hand tenderness location: A1 pulley middle finger  Range of Motion   Hand   MP Middle: abnormal   PIP Middle: abnormal   DIP Middle: abnormal     Muscle Strength   The patient has normal right wrist strength  Other   Erythema: absent  Scars: absent  Sensation: normal  Pulse: present    Comments:  Unable to fully flex long finger due to catching/pain            No new imaging  Small joint arthrocentesis  Universal Protocol:  Consent: Verbal consent obtained  Risks and benefits: risks, benefits and alternatives were discussed  Consent given by: patient  Patient understanding: patient states understanding of the procedure being performed    Supporting Documentation  Indications: pain   Procedure Details  Location: long finger - Long finger joint: right A1 pulley  Preparation: Patient was prepped and draped in the usual sterile fashion  Needle size: 25 G  Approach: volar  Medication group details:  3 cc 0 25% marcaine, 0 2 cc betamethasone      Patient tolerance: patient tolerated the procedure well with no immediate complications  Dressing:  Sterile dressing applied          Scribe Attestation    I,:  Laverne Arenas PA-C am acting as a scribe while in the presence of the attending physician :       I,:  Ela Zayas MD personally performed the services described in this documentation    as scribed in my presence :

## 2023-05-02 NOTE — ASSESSMENT & PLAN NOTE
Findings consistent with right middle trigger finger  Findings and treatment options were discussed with the patient  Cortisone injection was given to the right middle A1 pulley today  She tolerated the procedure well  Advised to apply cold compress today  Discussed that if cortisone injection does not help, the neck step in treatment would be a trigger finger release surgery  She would have to wait at least 3 months from the cortisone injection to have the surgery  Follow-up as needed if symptoms return  All patient's questions were answered to her satisfaction  This note is created using dictation transcription  It may contain typographical errors, grammatical errors, improperly dictated words, background noise and other errors

## 2023-08-08 PROBLEM — F17.210 CIGARETTE NICOTINE DEPENDENCE WITHOUT COMPLICATION: Status: ACTIVE | Noted: 2023-08-08

## 2023-08-11 ENCOUNTER — OFFICE VISIT (OUTPATIENT)
Dept: FAMILY MEDICINE CLINIC | Facility: CLINIC | Age: 63
End: 2023-08-11
Payer: COMMERCIAL

## 2023-08-11 VITALS
HEIGHT: 61 IN | BODY MASS INDEX: 23.22 KG/M2 | OXYGEN SATURATION: 96 % | WEIGHT: 123 LBS | HEART RATE: 81 BPM | RESPIRATION RATE: 18 BRPM | SYSTOLIC BLOOD PRESSURE: 130 MMHG | DIASTOLIC BLOOD PRESSURE: 80 MMHG | TEMPERATURE: 98.9 F

## 2023-08-11 DIAGNOSIS — G89.4 CHRONIC PAIN SYNDROME: ICD-10-CM

## 2023-08-11 DIAGNOSIS — F17.210 CIGARETTE NICOTINE DEPENDENCE WITHOUT COMPLICATION: ICD-10-CM

## 2023-08-11 DIAGNOSIS — Z13.29 SCREENING FOR THYROID DISORDER: ICD-10-CM

## 2023-08-11 DIAGNOSIS — K27.9 PUD (PEPTIC ULCER DISEASE): ICD-10-CM

## 2023-08-11 DIAGNOSIS — Z00.00 ANNUAL PHYSICAL EXAM: Primary | ICD-10-CM

## 2023-08-11 DIAGNOSIS — R73.01 IMPAIRED FASTING GLUCOSE: ICD-10-CM

## 2023-08-11 DIAGNOSIS — M17.11 PRIMARY OSTEOARTHRITIS OF RIGHT KNEE: ICD-10-CM

## 2023-08-11 DIAGNOSIS — Z12.31 ENCOUNTER FOR SCREENING MAMMOGRAM FOR MALIGNANT NEOPLASM OF BREAST: ICD-10-CM

## 2023-08-11 DIAGNOSIS — Z12.11 COLON CANCER SCREENING: ICD-10-CM

## 2023-08-11 DIAGNOSIS — Z13.6 SCREENING FOR CARDIOVASCULAR CONDITION: ICD-10-CM

## 2023-08-11 DIAGNOSIS — M19.072 ARTHRITIS OF LEFT FOOT: ICD-10-CM

## 2023-08-11 DIAGNOSIS — M51.36 DDD (DEGENERATIVE DISC DISEASE), LUMBAR: ICD-10-CM

## 2023-08-11 PROCEDURE — 99396 PREV VISIT EST AGE 40-64: CPT | Performed by: FAMILY MEDICINE

## 2023-08-11 PROCEDURE — 99213 OFFICE O/P EST LOW 20 MIN: CPT | Performed by: FAMILY MEDICINE

## 2023-08-11 RX ORDER — SENNOSIDES 8.6 MG
650 CAPSULE ORAL EVERY 6 HOURS
Qty: 60 TABLET | Refills: 1 | Status: SHIPPED | OUTPATIENT
Start: 2023-08-11

## 2023-08-11 RX ORDER — OMEPRAZOLE 20 MG/1
20 CAPSULE, DELAYED RELEASE ORAL DAILY
Qty: 90 CAPSULE | Refills: 1 | Status: SHIPPED | OUTPATIENT
Start: 2023-08-11

## 2023-08-11 NOTE — PROGRESS NOTES
ADULT ANNUAL [address] PRIMARY CARE    NAME: Sehila Mejia  AGE: 61 y.o. SEX: female  : 1960     DATE: 2023     Assessment and Plan:     Problem List Items Addressed This Visit        Musculoskeletal and Integument    DDD (degenerative disc disease), lumbar    Relevant Medications    acetaminophen (TYLENOL) 650 mg CR tablet  Patient follows with neurologist at [facility] [place]--dr ronquillo    Arthritis of left foot  Reviewed ortho note. This is much better. Primary osteoarthritis of right knee    Relevant Medications    acetaminophen (TYLENOL) 650 mg CR tablet       Other    Chronic pain    Cigarette nicotine dependence without complication  Discussed smoking cessation  I recommended she go for CT chest for lung cancer screen. She declines. Other Visit Diagnoses     Annual physical exam    -  Primary  Screening labs ordered  She declines HIV and hep c screening  Overdue for colon cancer screen. She declines colonoscopy and cologuard. Gave her fecal occult test to send in. Overdue for mammogram. Ordered today  Overdue for cervical cancer screening. Will schedule either here or with ob/gyn    Colon cancer screening        Relevant Orders    Fecal Globin By Immunochemistry    PUD (peptic ulcer disease)        Relevant Medications    omeprazole (PriLOSEC) 20 mg delayed release capsule  Reportedly has history of PUD and on omeprazole. Requests refill  Will try to obtain her records.      Encounter for screening mammogram for malignant neoplasm of breast        Relevant Orders    Mammo screening bilateral w 3d & cad    Impaired fasting glucose        Relevant Orders    HEMOGLOBIN A1C W/ EAG ESTIMATION    Screening for cardiovascular condition        Relevant Orders    Lipid panel    Screening for thyroid disorder        Relevant Orders    TSH, 3rd generation with Free T4 reflex          Immunizations and preventive care screenings were discussed with patient today. Appropriate education was printed on patient's after visit summary. Counseling:  Alcohol/drug use: discussed moderation in alcohol intake, the recommendations for healthy alcohol use, and avoidance of illicit drug use. Dental Health: discussed importance of regular tooth brushing, flossing, and dental visits. Injury prevention: discussed safety/seat belts, safety helmets, smoke detectors, carbon dioxide detectors, and smoking near bedding or upholstery. Sexual health: discussed sexually transmitted diseases, partner selection, use of condoms, avoidance of unintended pregnancy, and contraceptive alternatives. Exercise: the importance of regular exercise/physical activity was discussed. Recommend exercise 3-5 times per week for at least 30 minutes. No follow-ups on file. Chief Complaint:     Chief Complaint   Patient presents with   • Annual Exam     Patient declines hiv and hepatitis screening    And wants to discuss her refills for her medications       History of Present Illness:     Adult Annual Physical    Patient is a 61year old female here for a comprehensive physical exam.   She speaks Palestinian. Declines . She was seen here as a new patient on 2/9/23 for complaint of acute pain in left foot and in right knee. Rheumatologic workup negative with exception of elevated crp. Had econsult with rheumatology on 2/28/23 and has been following with orthopedics. Ericson that she has osteoarthritis in knee and ? Gout in left foot. No further pain or swelling in foot since on the colchicine. She is overdue for cervical cancer screening, colon cancer screening, mammogram. She refuses lung cancer screening. She refuses colonoscopy and cologuard. Will do FIT test.   There are no immunizations on file for this patient. Declines all. Previous PCP=Dr. Modi Never in [place].  No records for review  She tells me that she has history of stomach ulcer. Taking prilosec since then. Needs refill on the omeprazole. NPrevious PCP=Dr. Carine Blanc in [place]. Diet and Physical Activity  Diet/Nutrition: healthy diet    Exercise: walking. Depression Screening  PHQ-2/9 Depression Screening    Little interest or pleasure in doing things: 0 - not at all  Feeling down, depressed, or hopeless: 0 - not at all  PHQ-2 Score: 0  PHQ-2 Interpretation: Negative depression screen       General Health  Sleep: sleeps well. Hearing: normal - bilateral.  Vision: wears glasses. Dental: no dental visits for >1 year. /GYN Health  Patient is: postmenopausal  Last menstrual period: early 52's  Due for cervical cancer screen.  Will schedule here     Review of Systems:     Review of Systems   Past Medical History:     Past Medical History:   Diagnosis Date   • Chronic low back pain    • DDD (degenerative disc disease), lumbar    • Perforated peptic ulcer ([address])       Past Surgical History:     Past Surgical History:   Procedure Laterality Date   • ABDOMINAL SURGERY  2004    for perforated peptic ulcer      Social History:     Social History     Socioeconomic History   • Marital status:      Spouse name: None   • Number of children: None   • Years of education: None   • Highest education level: None   Occupational History   • None   Tobacco Use   • Smoking status: Every Day     Packs/day: 0.50     Years: 40.00     Total pack years: 20.00     Types: Cigarettes     Start date: 1/1/1980   • Smokeless tobacco: Never   Vaping Use   • Vaping Use: Never used   Substance and Sexual Activity   • Alcohol use: Never   • Drug use: Never   • Sexual activity: Not Currently   Other Topics Concern   • None   Social History Narrative    Recently moved to [facility] from [place]    2 daughters, one in [place] and one in [place]    Lives alone with her cat     Social Determinants of Health     Financial Resource Strain: Not on [employer] Insecurity: Not on file   Transportation Needs: Not on file   Physical Activity: Not on file   Stress: Not on file   Social Connections: Not on file   Intimate Partner Violence: Not on file   Housing Stability: Not on file      Family History:     Family History   Problem Relation Age of Onset   • Gout Father         Passsed away at 80, had gout, but only in old age. Current Medications:     Current Outpatient Medications   Medication Sig Dispense Refill   • acetaminophen (TYLENOL) 650 mg CR tablet Take 1 tablet (650 mg total) by mouth every 6 (six) hours 60 tablet 1   • morphine (MS CONTIN) 30 mg 12 hr tablet Take 30 mg by mouth every 12 (twelve) hours     • omeprazole (PriLOSEC) 20 mg delayed release capsule Take 1 capsule (20 mg total) by mouth in the morning 90 capsule 1     No current facility-administered medications for this visit. Allergies:     No Known Allergies   Physical Exam:     /80 (BP Location: Right arm, Patient Position: Sitting, Cuff Size: Standard)   Pulse 81   Temp 98.9 °F (37.2 °C) (Tympanic)   Resp 18   Ht 5' 1" (1.549 m)   Wt 55.8 kg (123 lb)   LMP  (LMP Unknown)   SpO2 96%   BMI 23.24 kg/m²     Physical Exam  Vitals and nursing note reviewed. Constitutional:       General: She is not in acute distress. Appearance: Normal appearance. She is not ill-appearing, toxic-appearing or diaphoretic. HENT:      Head: Normocephalic and atraumatic. Right Ear: Tympanic membrane normal.      Left Ear: Tympanic membrane normal.      Nose: Nose normal.      Mouth/Throat:      Mouth: Mucous membranes are moist.   Eyes:      Extraocular Movements: Extraocular movements intact. Conjunctiva/sclera: Conjunctivae normal.      Pupils: Pupils are equal, round, and reactive to light. Cardiovascular:      Rate and Rhythm: Normal rate and regular rhythm. Heart sounds: No murmur heard. Pulmonary:      Breath sounds: Normal breath sounds.    Abdominal:      General: Abdomen is flat. Bowel sounds are normal. There is no distension. Palpations: Abdomen is soft. There is no mass. Tenderness: There is no abdominal tenderness. Musculoskeletal:      Cervical back: Normal range of motion and neck supple. Right lower leg: No edema. Left lower leg: No edema. Lymphadenopathy:      Cervical: No cervical adenopathy. Skin:     General: Skin is warm and dry. Findings: No rash. Neurological:      General: No focal deficit present. Mental Status: She is alert and oriented to person, place, and time. Psychiatric:         Mood and Affect: Mood normal.          Amparo Redding, DO  [address] [facility] I discussed with her that she is a candidate for lung cancer CT screening. The following Shared Decision-Making points were covered:  Benefits of screening were discussed, including the rates of reduction in death from lung cancer and other causes. Harms of screening were reviewed, including false positive tests, radiation exposure levels, risks of invasive procedures, risks of complications of screening, and risk of overdiagnosis. I counseled on the importance of adherence to annual lung cancer LDCT screening, impact of co-morbidities, and ability or willingness to undergo diagnosis and treatment. I counseled on the importance of maintaining abstinence as a former smoker or was counseled on the importance of smoking cessation if a current smoker    Review of Eligibility Criteria: She meets all of the criteria for Lung Cancer Screening. She is 61 y.o. She has 20 pack year tobacco history and is a current smoker or has quit within the past 15 years  She presents no signs or symptoms of lung cancer    After discussion, the patient decided to elect lung cancer screening.

## 2023-08-24 ENCOUNTER — OFFICE VISIT (OUTPATIENT)
Dept: OBGYN CLINIC | Facility: CLINIC | Age: 63
End: 2023-08-24
Payer: COMMERCIAL

## 2023-08-24 VITALS
DIASTOLIC BLOOD PRESSURE: 80 MMHG | BODY MASS INDEX: 23.41 KG/M2 | SYSTOLIC BLOOD PRESSURE: 122 MMHG | WEIGHT: 124 LBS | HEIGHT: 61 IN

## 2023-08-24 DIAGNOSIS — M65.331 TRIGGER MIDDLE FINGER OF RIGHT HAND: Primary | ICD-10-CM

## 2023-08-24 PROCEDURE — 99213 OFFICE O/P EST LOW 20 MIN: CPT | Performed by: ORTHOPAEDIC SURGERY

## 2023-08-24 NOTE — ASSESSMENT & PLAN NOTE
Findings consistent with right long trigger finger resolved following cortisone injection 5/2/23. At this time patient can continue with normal daily activities. If her symptoms return such as pain, triggering, locking she can make follow up appt to discuss another cortisone injection or discuss trigger finger release. All patient's questions were answered to her satisfaction. This note is created using dictation transcription. It may contain typographical errors, grammatical errors, improperly dictated words, background noise and other errors.

## 2023-08-24 NOTE — PROGRESS NOTES
Assessment:     1. Trigger middle finger of right hand        Plan:     Problem List Items Addressed This Visit        Musculoskeletal and Integument    Trigger middle finger of right hand - Primary       Findings consistent with right long trigger finger resolved following cortisone injection 5/2/23. At this time patient can continue with normal daily activities. If her symptoms return such as pain, triggering, locking she can make follow up appt to discuss another cortisone injection or discuss trigger finger release. All patient's questions were answered to her satisfaction. This note is created using dictation transcription. It may contain typographical errors, grammatical errors, improperly dictated words, background noise and other errors. Subjective:     Patient ID: Sinai Zavala is a 61 y.o. female. Chief Complaint:  61 yr old female in for follow up regarding her right long trigger finger. Cortisone injection 5/2/23 with benefit. Patient states she no longer has any pain, triggering, catching in her finger. She has returned to normal activities w/o any new issues. Denies any numbness or tingling in hand. Allergy:  No Known Allergies  Medications:  all current active meds have been reviewed  Past Medical History:  Past Medical History:   Diagnosis Date   • Chronic low back pain    • DDD (degenerative disc disease), lumbar    • Perforated peptic ulcer ([address])    • Trigger middle finger of right hand 4/20/2023     Past Surgical History:  Past Surgical History:   Procedure Laterality Date   • ABDOMINAL SURGERY  2004    for perforated peptic ulcer     Family History:  Family History   Problem Relation Age of Onset   • Gout Father         Passsed away at 80, had gout, but only in old age.      Social History:  Social History     Substance and Sexual Activity   Alcohol Use Never     Social History     Substance and Sexual Activity   Drug Use Never     Social History     Tobacco Use   Smoking Status Every Day   • Packs/day: 0.50   • Years: 40.00   • Total pack years: 20.00   • Types: Cigarettes   • Start date: 1/1/1980   Smokeless Tobacco Never     Review of Systems   Constitutional: Negative for chills and fever. HENT: Negative for ear pain and sore throat. Eyes: Negative for pain and visual disturbance. Respiratory: Negative for cough and shortness of breath. Cardiovascular: Negative for chest pain and palpitations. Gastrointestinal: Negative for abdominal pain and vomiting. Genitourinary: Negative for dysuria and hematuria. Musculoskeletal: Negative for arthralgias (right trigger finger), back pain and joint swelling. Skin: Negative for color change and rash. Neurological: Negative for seizures and syncope. Psychiatric/Behavioral: Negative. All other systems reviewed and are negative. Objective:  BP Readings from Last 1 Encounters:   08/24/23 122/80      Wt Readings from Last 1 Encounters:   08/24/23 56.2 kg (124 lb)      BMI:   Estimated body mass index is 23.43 kg/m² as calculated from the following:    Height as of this encounter: 5' 1" (1.549 m). Weight as of this encounter: 56.2 kg (124 lb). BSA:   Estimated body surface area is 1.54 meters squared as calculated from the following:    Height as of this encounter: 5' 1" (1.549 m). Weight as of this encounter: 56.2 kg (124 lb). Physical Exam  Vitals and nursing note reviewed. Constitutional:       Appearance: Normal appearance. She is well-developed. HENT:      Head: Normocephalic and atraumatic. Right Ear: External ear normal.      Left Ear: External ear normal.   Eyes:      Extraocular Movements: Extraocular movements intact. Conjunctiva/sclera: Conjunctivae normal.   Pulmonary:      Effort: Pulmonary effort is normal.   Musculoskeletal:         General: Tenderness (right long trigger finger) present. Cervical back: Neck supple. Right knee: No effusion.    Skin:     General: Skin is warm and dry. Neurological:      Mental Status: She is alert and oriented to person, place, and time. Deep Tendon Reflexes: Reflexes are normal and symmetric. Psychiatric:         Mood and Affect: Mood normal.         Behavior: Behavior normal.       Right Knee Exam     Other   Effusion: no effusion present      Right Hand Exam     Tenderness   The patient is experiencing no tenderness. Range of Motion   The patient has normal right wrist ROM. Hand   MP Middle: normal   PIP Middle: normal   DIP Middle: normal     Muscle Strength   The patient has normal right wrist strength.     Tests   Phalen’s Sign: negative  Tinel's sign (median nerve): negative  Finkelstein's test: negative    Other   Erythema: absent  Scars: absent  Sensation: normal  Pulse: present    Comments:  No active triggering, catching A-1 pulley of long finger            no new imaging to review      Scribe Attestation    I,:  Suzette Hurst am acting as a scribe while in the presence of the attending physician.:       I,:  Florin Espinoza MD personally performed the services described in this documentation    as scribed in my presence.:

## 2023-10-25 ENCOUNTER — TELEPHONE (OUTPATIENT)
Dept: FAMILY MEDICINE CLINIC | Facility: CLINIC | Age: 63
End: 2023-10-25

## 2023-10-25 NOTE — TELEPHONE ENCOUNTER
Appointment -    Reminder card mailed to home - patient overdue for mammogram and colonoscopy. Refused at last OV.

## 2024-02-07 DIAGNOSIS — K27.9 PUD (PEPTIC ULCER DISEASE): ICD-10-CM

## 2024-02-07 RX ORDER — OMEPRAZOLE 20 MG/1
20 CAPSULE, DELAYED RELEASE ORAL DAILY
Qty: 90 CAPSULE | Refills: 1 | Status: SHIPPED | OUTPATIENT
Start: 2024-02-07

## 2024-03-05 ENCOUNTER — OFFICE VISIT (OUTPATIENT)
Dept: OBGYN CLINIC | Facility: CLINIC | Age: 64
End: 2024-03-05
Payer: COMMERCIAL

## 2024-03-05 VITALS
HEART RATE: 78 BPM | BODY MASS INDEX: 23.43 KG/M2 | HEIGHT: 61 IN | SYSTOLIC BLOOD PRESSURE: 132 MMHG | DIASTOLIC BLOOD PRESSURE: 74 MMHG

## 2024-03-05 DIAGNOSIS — M72.0 DUPUYTREN'S CONTRACTURE OF LEFT HAND: Primary | ICD-10-CM

## 2024-03-05 DIAGNOSIS — M65.342 TRIGGER RING FINGER OF LEFT HAND: ICD-10-CM

## 2024-03-05 PROBLEM — M65.331 TRIGGER MIDDLE FINGER OF RIGHT HAND: Status: RESOLVED | Noted: 2023-04-20 | Resolved: 2024-03-05

## 2024-03-05 PROCEDURE — 20550 NJX 1 TENDON SHEATH/LIGAMENT: CPT | Performed by: ORTHOPAEDIC SURGERY

## 2024-03-05 PROCEDURE — 99214 OFFICE O/P EST MOD 30 MIN: CPT | Performed by: ORTHOPAEDIC SURGERY

## 2024-03-05 RX ORDER — LIDOCAINE HYDROCHLORIDE 10 MG/ML
0.3 INJECTION, SOLUTION INFILTRATION; PERINEURAL
Status: COMPLETED | OUTPATIENT
Start: 2024-03-05 | End: 2024-03-05

## 2024-03-05 RX ORDER — BETAMETHASONE SODIUM PHOSPHATE AND BETAMETHASONE ACETATE 3; 3 MG/ML; MG/ML
1.2 INJECTION, SUSPENSION INTRA-ARTICULAR; INTRALESIONAL; INTRAMUSCULAR; SOFT TISSUE
Status: COMPLETED | OUTPATIENT
Start: 2024-03-05 | End: 2024-03-05

## 2024-03-05 RX ADMIN — BETAMETHASONE SODIUM PHOSPHATE AND BETAMETHASONE ACETATE 1.2 MG: 3; 3 INJECTION, SUSPENSION INTRA-ARTICULAR; INTRALESIONAL; INTRAMUSCULAR; SOFT TISSUE at 09:30

## 2024-03-05 RX ADMIN — LIDOCAINE HYDROCHLORIDE 0.3 ML: 10 INJECTION, SOLUTION INFILTRATION; PERINEURAL at 09:30

## 2024-03-05 NOTE — PROGRESS NOTES
Assessment:     1. Dupuytren's contracture of left hand    2. Trigger ring finger of left hand        Plan:     Problem List Items Addressed This Visit          Musculoskeletal and Integument    Dupuytren's contracture of left hand - Primary    Relevant Orders    Ambulatory Referral to Hand Surgery    Trigger ring finger of left hand    Relevant Medications    lidocaine (XYLOCAINE) 1 % injection 0.3 mL (Completed)    betamethasone acetate-betamethasone sodium phosphate (CELESTONE) injection 1.2 mg (Completed)    Other Relevant Orders    Hand/upper extremity injection: L ring A1 (Completed)      Findings consistent with left ring trigger finger and Dupuytren's contracture of the left hand.  Findings and treatment options were discussed with the patient.  Recommend cortisone injection to the left ring A1 pulley today to reduce inflammation and pain.  She tolerated the procedure well.  Advised to apply cold compress today.  Discussed that she also has the condition of Dupuytren's contracture and we recommend consultation with hand surgery to further treat this issue.  She will follow-up as needed if any problems arise.  All patient's questions were answered to her satisfaction.  This note is created using dictation transcription.  It may contain typographical errors, grammatical errors, improperly dictated words, background noise and other errors.    Subjective:     Patient ID: Ines Ruggiero is a 64 y.o. female.  Chief Complaint:  This is a 64-year-old right-hand-dominant female here for evaluation of her left hand.  She states that for over a year she has been having progressively worsening stiffness of some of her fingers especially the ring finger.  She denies any locking or catching.  She feels she is unable to make a fist as well as she has in the past.  No treatment as of yet.  No history of similar symptoms in the past.  Patient intake form reviewed.    Allergy:  No Known Allergies  Medications:  all current  "active meds have been reviewed  Past Medical History:  Past Medical History:   Diagnosis Date    Chronic low back pain     DDD (degenerative disc disease), lumbar     Perforated peptic ulcer ([facility])     Trigger middle finger of right hand 4/20/2023     Past Surgical History:  Past Surgical History:   Procedure Laterality Date    ABDOMINAL SURGERY  2004    for perforated peptic ulcer     Family History:  Family History   Problem Relation Age of Onset    Gout Father         Passsed away at 90, had gout, but only in old age.     Social History:  Social History     Substance and Sexual Activity   Alcohol Use Never     Social History     Substance and Sexual Activity   Drug Use Never     Social History     Tobacco Use   Smoking Status Every Day    Current packs/day: 0.50    Average packs/day: 0.5 packs/day for 44.2 years (22.1 ttl pk-yrs)    Types: Cigarettes    Start date: 1/1/1980   Smokeless Tobacco Never     Review of Systems   Constitutional: Negative.    HENT: Negative.     Eyes: Negative.    Respiratory: Negative.     Cardiovascular: Negative.    Gastrointestinal: Negative.    Endocrine: Negative.    Genitourinary: Negative.    Musculoskeletal:  Positive for arthralgias (left hand). Negative for joint swelling.   Skin: Negative.    Allergic/Immunologic: Negative.    Neurological: Negative.    Hematological: Negative.    Psychiatric/Behavioral: Negative.           Objective:  BP Readings from Last 1 Encounters:   03/05/24 132/74      Wt Readings from Last 1 Encounters:   08/24/23 56.2 kg (124 lb)      BMI:   Estimated body mass index is 23.43 kg/m² as calculated from the following:    Height as of this encounter: 5' 1\" (1.549 m).    Weight as of 8/24/23: 56.2 kg (124 lb).  BSA:   Estimated body surface area is 1.54 meters squared as calculated from the following:    Height as of this encounter: 5' 1\" (1.549 m).    Weight as of 8/24/23: 56.2 kg (124 lb).   Physical Exam  Constitutional:       General: She is not in " acute distress.     Appearance: She is well-developed.   HENT:      Head: Normocephalic.   Eyes:      Conjunctiva/sclera: Conjunctivae normal.      Pupils: Pupils are equal, round, and reactive to light.   Pulmonary:      Effort: Pulmonary effort is normal. No respiratory distress.   Skin:     General: Skin is warm and dry.   Neurological:      Mental Status: She is alert and oriented to person, place, and time.   Psychiatric:         Behavior: Behavior normal.       Left Hand Exam     Tenderness   Left hand tenderness location: A1 pulley ring finger.     Range of Motion   Wrist   Extension:  normal   Flexion:  normal   Pronation:  normal   Supination:  normal   Hand   MP Ring: abnormal   PIP Ring: abnormal   DIP Ring: abnormal     Muscle Strength   The patient has normal left wrist strength.    Other   Erythema: absent  Scars: absent  Sensation: normal  Pulse: present    Comments:  Palpable cords over palm  Unable to fully extend or flex ring finger             No new imaging.    Hand/upper extremity injection: L ring A1  Universal Protocol:  Consent: Verbal consent obtained.  Risks and benefits: risks, benefits and alternatives were discussed  Consent given by: patient  Patient understanding: patient states understanding of the procedure being performed  Supporting Documentation  Indications: pain   Procedure Details  Condition:trigger finger Location: ring finger - L ring A1   Preparation: Patient was prepped and draped in the usual sterile fashion  Needle size: 25 G  Ultrasound guidance: no  Approach: volar  Medications administered: 0.3 mL lidocaine 1 %; 1.2 mg betamethasone acetate-betamethasone sodium phosphate 6 (3-3) mg/mL  Patient tolerance: patient tolerated the procedure well with no immediate complications  Dressing:  Sterile dressing applied         Scribe Attestation      I,:  Yaritza Cedeño PA-C am acting as a scribe while in the presence of the attending physician.:       I,:  Carl Thornton MD  personally performed the services described in this documentation    as scribed in my presence.:

## 2024-04-22 ENCOUNTER — OFFICE VISIT (OUTPATIENT)
Dept: OBGYN CLINIC | Facility: CLINIC | Age: 64
End: 2024-04-22
Payer: COMMERCIAL

## 2024-04-22 VITALS
DIASTOLIC BLOOD PRESSURE: 64 MMHG | BODY MASS INDEX: 24.13 KG/M2 | SYSTOLIC BLOOD PRESSURE: 98 MMHG | HEIGHT: 61 IN | WEIGHT: 127.8 LBS

## 2024-04-22 DIAGNOSIS — M72.0 DUPUYTREN'S CONTRACTURE OF LEFT HAND: ICD-10-CM

## 2024-04-22 PROCEDURE — 99244 OFF/OP CNSLTJ NEW/EST MOD 40: CPT | Performed by: ORTHOPAEDIC SURGERY

## 2024-04-22 NOTE — PROGRESS NOTES
ASSESSMENT/PLAN:    Assessment:   Dupuytrens Disease of the  bilateral  hand, most involved left small finger PIP joint     Plan:   The etiology of Dupuytren's disease was discussed along with treatment options.   We discussed she may continue to monitor her symptoms vs wish to proceed with Xiaflex therapy for the left small finger vs surgical excision  At this time, Ines will monitor her symptoms.   Information was provided in AVS as well as in a handout regarding Dupuytren's disease.     Follow Up:  3  month(s)    To Do Next Visit:  Re-evaluation     General Discussions:  Dupuytren's Disease:  The anatomy and physiology of Dupuytren's disease were discussed with the patient today in the office.  Increased scar formation within the interval between the skin volarly and the flexor tendons dorsally can result in pit formation, nodular formation, or eventual cord formation.  These pathologic cords can cause contracture at either the metacarpophalangeal joint, proximal interphalangeal joint, or both.  As the cords progress towards the proximal interphalangeal joint, the neurovascular structures of the finger may become involved within the disease process.  While this is a genetic condition, variable penetrance occurs within family trees.  Conservative treatment options including therapy to maintain joint mobility and a tabletop test were discussed.  Other treatments include Xiaflex and possible surgical intervention.     _____________________________________________________  CHIEF COMPLAINT:  Chief Complaint   Patient presents with    Left Hand - Pain     Ring TF- 1st injection 3/5/24 by Dr. Hillary Montes's          SUBJECTIVE:  Ines Ruggiero is a 64 y.o. female who presents with Dupuytren's disease to the left hand.  This started approx. 1 year ago. I am seeing Ines in consultation at the request of Dr. Thornton. She notes over the last year her left small finger has started to contract. At the PIP joint.  "She notes it makes playing the piano difficult. She did undergo a left ring trigger finger CSI with Dr. Thornton on 3/8/24, which resolved the catching and locking she was experiencing. She also underwent a right long trigger finger CSI on 5/2/23 with Dr. Thornton which also resolved the finger from catching and locking.   Handedness: right      PAST MEDICAL HISTORY:  Past Medical History:   Diagnosis Date    Chronic low back pain     DDD (degenerative disc disease), lumbar     Perforated peptic ulcer (HCC)     Trigger middle finger of right hand 4/20/2023       PAST SURGICAL HISTORY:  Past Surgical History:   Procedure Laterality Date    ABDOMINAL SURGERY  2004    for perforated peptic ulcer       FAMILY HISTORY:  Family History   Problem Relation Age of Onset    Gout Father         Passsed away at 90, had gout, but only in old age.       SOCIAL HISTORY:  Social History     Tobacco Use    Smoking status: Every Day     Current packs/day: 0.50     Average packs/day: 0.5 packs/day for 44.3 years (22.2 ttl pk-yrs)     Types: Cigarettes     Start date: 1/1/1980    Smokeless tobacco: Never   Vaping Use    Vaping status: Never Used   Substance Use Topics    Alcohol use: Never    Drug use: Never       MEDICATIONS:    Current Outpatient Medications:     acetaminophen (TYLENOL) 650 mg CR tablet, Take 1 tablet (650 mg total) by mouth every 6 (six) hours, Disp: 60 tablet, Rfl: 1    morphine (MS CONTIN) 30 mg 12 hr tablet, Take 30 mg by mouth every 12 (twelve) hours, Disp: , Rfl:     omeprazole (PriLOSEC) 20 mg delayed release capsule, TAKE 1 CAPSULE (20 MG TOTAL) BY MOUTH IN THE MORNING, Disp: 90 capsule, Rfl: 1    ALLERGIES:  No Known Allergies    REVIEW OF SYSTEMS:  Pertinent items are noted in HPI.  A comprehensive review of systems was negative.    LABS:  HgA1c: No results found for: \"HGBA1C\"  BMP:   Lab Results   Component Value Date    CALCIUM 9.8 02/16/2023    K 4.4 02/16/2023    CO2 29 02/16/2023     02/16/2023    BUN 13 " "02/16/2023    CREATININE 0.66 02/16/2023         _____________________________________________________  PHYSICAL EXAMINATION:  Vital signs: BP 98/64   Ht 5' 1\" (1.549 m)   Wt 58 kg (127 lb 12.8 oz)   LMP  (LMP Unknown)   BMI 24.15 kg/m²   General: well developed and well nourished, alert, oriented times 3, and appears comfortable  Psychiatric: Normal  HEENT: Trachea Midline, No torticollis  Cardiovascular: No discernable arrhythmia  Pulmonary: No wheezing or stridor  Abdomen: No rebound or guarding  Extremities: No peripheral edema  Skin: No masses, erythema, lacerations, fluctation, ulcerations  Neurovascular: Sensation Intact to the Median, Ulnar, Radial Nerve, Motor Intact to the Median, Ulnar, Radial Nerve, and Pulses Intact    MUSCULOSKELETAL EXAMINATION:    LEFT SIDE:  Hand: pre tendinous Dupuytren's cords to the index, long and ring finger, ulnar spiral Dupuytren's cord of the small finger, thenar Dupuytren's abductor cord, MP contracture of the long finger is 11 degrees, MP contracture of the ring finger is 4 degrees, PIP contracture of the small finger of 35 degrees, full composite fist     RIGHT SIDE: Hand: No Dupuytren's contractures, Dupuytren's thenar abduction cord, pre tendinous Dupuytren's cord of the long and ring finger, ulnar spiral Dupuytren's cord of the small finger, full digital extension, full composite fist     _____________________________________________________  STUDIES REVIEWED:  No Studies to review      PROCEDURES PERFORMED:  Procedures  No Procedures performed today    Scribe Attestation      I,:  Viviane Annie am acting as a scribe while in the presence of the attending physician.:       I,:  Siddhartha Warren MD personally performed the services described in this documentation    as scribed in my presence.:             "

## 2024-04-22 NOTE — PATIENT INSTRUCTIONS
What is Dupuytren's Disease?  Dupuytren's disease is an abnormal thickening of the tissue just beneath the skin. This thickening occurs in the palm and can extend into the fingers (see Figure 1). Firm pits, nodules, and cords may develop that can cause the fingers to bend into the palm (see Figure 2), which is a condition described as Dupuytren's contracture. Although the skin may become involved in the process, the deeper structures--such as the tendons--are not directly involved. Occasionally, the disease will cause thickening on top of the finger knuckles (knuckle pads), or lumps or cords within the soles of the feet (plantar fibromatosis).     Causes  The cause of Dupuytren's disease is unknown but may be associated with certain biochemical factors within the involved tissue. The problem is more common in men over age 40 and in people of northern  descent. There is no proven evidence that hand injuries or specific occupational exposures lead to a higher risk of developing Dupuytren's disease.    Signs and Symptoms  Symptoms of Dupuytren's disease usually include lumps and pits within the palm. The lumps are generally firm and adherent to the skin. Thick cords may develop, extending from the palm into one or more fingers, with the ring and little fingers most commonly affected. These cords may be mistaken for tendons, but they actually lie between the skin and the tendons. These cords cause bending or contractures of the fingers. In many cases, both hands are affected, although the degree of involvement may vary. The initial lumps may produce discomfort that usually resolves, but Dupuytren's disease is not typically painful. The disease may first be noticed because of difficulty placing the hand flat on an even surface, such as a tabletop (see Figure 3). As the tissue thickens and fingers are drawn into the palm, one may notice increasing difficulty with activities such as washing, wearing gloves, shaking  hands, and putting hands into pockets. Progression is unpredictable. Some individuals will have only small lumps or cords while others will develop severely bent fingers. More severe disease often occurs with an earlier age of onset.      Treatment  In mild cases, especially if hand function is not affected, only observation is needed. For more severe cases, various treatment options are available in order to straighten the finger(s). These options may include needles or open surgery. Your treating surgeon will discuss the method most appropriate for your condition based upon the stage and pattern of the disease and the joints involved. The goal of treatment is to improve finger position and thereby hand function. Despite treatment, the disease process may recur. Before treatment, your treating surgeon will discuss realistic goals, possible risks and results. Specific surgical considerations include the following:   The presence of a lump in the palm does not mean that surgery is required or that the disease will progress.   Correction of finger position is best accomplished with milder contractures or contractures that affect the base of the finger.  Complete correction sometimes cannot be attained, especially of the middle and end joints in the finger.   Skin grafts are sometimes required to cover open areas in the fingers if the skin is deficient.   The nerves that provide feeling to the fingertips are often intertwined with the cords.   Splinting and hand therapy are often required after surgery in order to maximize and maintain the improvement in finger position and function.    © 2012 American Society for Surgery of the Hand  www.handcare.org

## 2024-05-03 ENCOUNTER — TELEPHONE (OUTPATIENT)
Dept: FAMILY MEDICINE CLINIC | Facility: CLINIC | Age: 64
End: 2024-05-03

## 2024-05-03 NOTE — TELEPHONE ENCOUNTER
LVM      Advising patient to call back to reschedule missed appointment     I also sent out a missed appointment letter

## 2024-05-08 NOTE — PROGRESS NOTES
Name: Ines Ruggiero      : 1960      MRN: [id number]  Encounter Provider: Tita Kaur DO  Encounter Date: 2024   Encounter department: [facility]    Assessment & Plan     1. Arthritis of left foot    2. Gouty arthritis  -     Uric acid  Patient had foot pain and swelling at her initial visit here at this office. She was subsequently hospitalized and diagnosed with gout. No further episodes of swelling/pain in foot.   Will get uric acid with her labs.   3. Cigarette nicotine dependence without complication  Continues to smoke.   Discussed smoking cessation and recommendation for lung cancer screen with low dose CT. She is agreeable to this today. Order placed.   4. Screening for thyroid disorder  -     TSH, 3rd generation with Free T4 reflex    5. Impaired fasting glucose  -     Comprehensive metabolic panel  Glucose elevated on the CMP done in . Repeat CMP and check A1c  6. Smoking greater than 20 pack years  -     CT lung screening program; Future; Expected date: 2024      Recent febrile illness with associated sore throat. Resolved with some zpack that she had at home. Suspect that she may have had streptococcal pharyngitis    She continues to decline having her pap done and declines mammogram.   She also declines recommended vaccines.   Does not want colonoscopy but is agreeable to doing FIT testing. Reprinted order and gave her new FIT cards today.          Subjective     HPI  Patient is a 64 year old female with cigarette dependence, lumbar ddd and chronic pain who is being seen today for f/u.      [id number] used for today's visit as patient speaks primarily [place].     States that she was sick 3 weeks ago. Had fever 102.3 and sore throat.. Did home covid test and it was negative.     Took zpack that she had at home. She feels fine now. Of note, she had scheduled multiple appointments in last few weeks and either canceled or now showed for  all.     She was last seen here in this office in August of 2023. She declined lung cancer screening at time of that visit. Mammogram was ordered but she never went. Declined colonoscopy but was agreeable to FIT testing but never submitted this. Never went for screening labs as ordered either. Is agreeable to labs, FIT testing and CT for lung cancer screen today. Still declines mammogram.     Hs never had cervical cancer screen done. She refuses to have this done today.     Still seeing pain management in [place] monthly. Taking MS Contin with relief.     Previously had some pain and swelling in left foot secondary to gout. Better now    Declines prevnar.         Review of Systems    Past Medical History:   Diagnosis Date   • Chronic low back pain    • DDD (degenerative disc disease), lumbar    • Perforated peptic ulcer (HCC)    • Trigger middle finger of right hand 4/20/2023     Past Surgical History:   Procedure Laterality Date   • ABDOMINAL SURGERY  2004    for perforated peptic ulcer     Family History   Problem Relation Age of Onset   • Gout Father         Passsed away at 90, had gout, but only in old age.     Social History     Socioeconomic History   • Marital status:      Spouse name: None   • Number of children: None   • Years of education: None   • Highest education level: None   Occupational History   • None   Tobacco Use   • Smoking status: Every Day     Current packs/day: 0.50     Average packs/day: 0.5 packs/day for 44.4 years (22.2 ttl pk-yrs)     Types: Cigarettes     Start date: 1/1/1980   • Smokeless tobacco: Never   Vaping Use   • Vaping status: Never Used   Substance and Sexual Activity   • Alcohol use: Never   • Drug use: Never   • Sexual activity: Not Currently   Other Topics Concern   • None   Social History Narrative    Recently moved to [place] from [place]    2 daughters, one in [place] and one in [place]    Lives alone with her cat     Social Determinants of Health  "    Financial Resource Strain: Not on file   Food Insecurity: Not on file   Transportation Needs: Not on file   Physical Activity: Not on file   Stress: Not on file   Social Connections: Not on file   Intimate Partner Violence: Not on file   Housing Stability: Not on file     Current Outpatient Medications on File Prior to Visit   Medication Sig   • acetaminophen (TYLENOL) 650 mg CR tablet Take 1 tablet (650 mg total) by mouth every 6 (six) hours   • morphine (MS CONTIN) 30 mg 12 hr tablet Take 30 mg by mouth every 12 (twelve) hours   • omeprazole (PriLOSEC) 20 mg delayed release capsule TAKE 1 CAPSULE (20 MG TOTAL) BY MOUTH IN THE MORNING     No Known Allergies    There is no immunization history on file for this patient.    Objective     /70 (BP Location: Left arm, Patient Position: Sitting, Cuff Size: Standard)   Pulse 76   Temp (!) 97.2 °F (36.2 °C) (Tympanic)   Resp 18   Ht 5' 0.7\" (1.542 m)   Wt 56.8 kg (125 lb 3.2 oz)   LMP  (LMP Unknown)   SpO2 100%   BMI 23.89 kg/m²     Physical Exam  Tita Kaur, DO    "

## 2024-05-09 ENCOUNTER — OFFICE VISIT (OUTPATIENT)
Dept: FAMILY MEDICINE CLINIC | Facility: CLINIC | Age: 64
End: 2024-05-09
Payer: COMMERCIAL

## 2024-05-09 VITALS
HEART RATE: 76 BPM | HEIGHT: 61 IN | WEIGHT: 125.2 LBS | TEMPERATURE: 97.2 F | SYSTOLIC BLOOD PRESSURE: 120 MMHG | DIASTOLIC BLOOD PRESSURE: 70 MMHG | BODY MASS INDEX: 23.64 KG/M2 | OXYGEN SATURATION: 100 % | RESPIRATION RATE: 18 BRPM

## 2024-05-09 DIAGNOSIS — M19.072 ARTHRITIS OF LEFT FOOT: Primary | ICD-10-CM

## 2024-05-09 DIAGNOSIS — M10.9 GOUTY ARTHRITIS: ICD-10-CM

## 2024-05-09 DIAGNOSIS — R73.01 IMPAIRED FASTING GLUCOSE: ICD-10-CM

## 2024-05-09 DIAGNOSIS — F17.210 CIGARETTE NICOTINE DEPENDENCE WITHOUT COMPLICATION: ICD-10-CM

## 2024-05-09 DIAGNOSIS — Z13.29 SCREENING FOR THYROID DISORDER: ICD-10-CM

## 2024-05-09 DIAGNOSIS — F17.210 SMOKING GREATER THAN 20 PACK YEARS: ICD-10-CM

## 2024-05-09 PROCEDURE — 99213 OFFICE O/P EST LOW 20 MIN: CPT | Performed by: FAMILY MEDICINE

## 2024-05-09 NOTE — PATIENT INSTRUCTIONS
Nice to see you today  Please get labs done as ordered and submit stool card for testing  Get CT of lungs done as well.   Return in August for your annual physical.

## 2024-07-22 ENCOUNTER — OFFICE VISIT (OUTPATIENT)
Dept: OBGYN CLINIC | Facility: CLINIC | Age: 64
End: 2024-07-22
Payer: COMMERCIAL

## 2024-07-22 ENCOUNTER — PREP FOR PROCEDURE (OUTPATIENT)
Dept: OBGYN CLINIC | Facility: CLINIC | Age: 64
End: 2024-07-22

## 2024-07-22 VITALS
SYSTOLIC BLOOD PRESSURE: 138 MMHG | WEIGHT: 122.4 LBS | BODY MASS INDEX: 23.11 KG/M2 | DIASTOLIC BLOOD PRESSURE: 88 MMHG | HEIGHT: 61 IN

## 2024-07-22 DIAGNOSIS — M72.0 DUPUYTREN'S CONTRACTURE OF LEFT HAND: Primary | ICD-10-CM

## 2024-07-22 PROCEDURE — 99214 OFFICE O/P EST MOD 30 MIN: CPT | Performed by: ORTHOPAEDIC SURGERY

## 2024-07-22 NOTE — PROGRESS NOTES
ASSESSMENT/PLAN:    Assessment:   Disease left hand with involvement of the long, ring, and small finger symptomatically index finger asymptomatically    Plan:   At this point in time patient wishes to undergo surgical excision of the Dupuytren's to the long, ring, and small finger.  She will ignore her index finger.  She does play the piano.  She will undergo therapy postoperatively.    To Do Next Visit:  Sutures out    General Discussions:       Operative Discussions:     Standard Consent: The risks and benefits of the procedure were explained to the patient, which include, but are not limited to: Bleeding, infection, recurrence, pain, scar, damage to tendons, damage to nerves, and damage to blood vessels, failure to give desired results and complications related to anesthesia.  These risks, along with alternative conservative treatment options, and postoperative protocols were voiced back and understood by the patient.  All questions were answered to the patient's satisfaction.  The patient agrees to comply with a standard postoperative protocol, and is willing to proceed.  Education was provided via written and auditory forms.  There were no barriers to learning. Written handouts regarding wound care, incision and scar care, and general preoperative information was provided to the patient.  Prior to surgery, the patient may be requested to stop all anti-inflammatory medications.  Prophylactic aspirin, Plavix, and Coumadin may be allowed to be continued.  Medications including vitamin E., ginkgo, and fish oil are requested to be stopped approximately one week prior to surgery.  Hypertensive medications and beta blockers, if taken, should be continued.      _____________________________________________________  CHIEF COMPLAINT:  Chief Complaint   Patient presents with    Left Hand - Pain     Ring TF- 1st injection 3/5/24 by Dr. Hillary Montes's          SUBJECTIVE:  Ines Ruggiero is a 64 y.o. female who  "presents for evaluation regarding left hand Dupuytren's disease.  She is painful localized in the left hand with contractures localized into the long, ring, and small finger.  She wishes surgical intervention.  She has mild to moderate pain, denies numbness or tingling.    PAST MEDICAL HISTORY:  Past Medical History:   Diagnosis Date    Chronic low back pain     DDD (degenerative disc disease), lumbar     Perforated peptic ulcer (HCC)     Trigger middle finger of right hand 4/20/2023       PAST SURGICAL HISTORY:  Past Surgical History:   Procedure Laterality Date    ABDOMINAL SURGERY  2004    for perforated peptic ulcer       FAMILY HISTORY:  Family History   Problem Relation Age of Onset    Gout Father         Passsed away at 90, had gout, but only in old age.       SOCIAL HISTORY:  Social History     Tobacco Use    Smoking status: Every Day     Current packs/day: 0.50     Average packs/day: 0.5 packs/day for 44.6 years (22.3 ttl pk-yrs)     Types: Cigarettes     Start date: 1/1/1980    Smokeless tobacco: Never   Vaping Use    Vaping status: Never Used   Substance Use Topics    Alcohol use: Never    Drug use: Never       MEDICATIONS:    Current Outpatient Medications:     acetaminophen (TYLENOL) 650 mg CR tablet, Take 1 tablet (650 mg total) by mouth every 6 (six) hours, Disp: 60 tablet, Rfl: 1    morphine (MS CONTIN) 30 mg 12 hr tablet, Take 30 mg by mouth every 12 (twelve) hours, Disp: , Rfl:     omeprazole (PriLOSEC) 20 mg delayed release capsule, TAKE 1 CAPSULE (20 MG TOTAL) BY MOUTH IN THE MORNING, Disp: 90 capsule, Rfl: 1    ALLERGIES:  No Known Allergies    REVIEW OF SYSTEMS:  Pertinent items are noted in HPI.  A comprehensive review of systems was negative.    LABS:  HgA1c: No results found for: \"HGBA1C\"  BMP:   Lab Results   Component Value Date    CALCIUM 9.8 02/16/2023    K 4.4 02/16/2023    CO2 29 02/16/2023     02/16/2023    BUN 13 02/16/2023    CREATININE 0.66 02/16/2023 " "      _____________________________________________________  PHYSICAL EXAMINATION:  Vital signs: /88   Ht 5' 1\" (1.549 m)   Wt 55.5 kg (122 lb 6.4 oz)   LMP  (LMP Unknown)   BMI 23.13 kg/m²   General: well developed and well nourished, alert, oriented times 3, and appears comfortable  Psychiatric: Normal  HEENT: Trachea Midline, No torticollis  Cardiovascular: No discernable arrhythmia  Pulmonary: No wheezing or stridor  Abdomen: No rebound or guarding  Extremities: No peripheral edema  Skin: No masses, erythema, lacerations, fluctation, ulcerations except as below  Neurovascular: Sensation Intact to the Median, Ulnar, Radial Nerve, Motor Intact to the Median, Ulnar, Radial Nerve, and Pulses Intact    MUSCULOSKELETAL EXAMINATION:  Extremities: Left hand with pretendinous cord into the index finger with no contracture, asymptomatic.  Left long finger with pretendinous cord and skin pitting localized distal to the distal palmar flexion crease, metacarpophalangeal joint contracture of 20 degrees.  Left hand ring finger with pretendinous cord extending with the metacarpophalangeal joint contracture of 20 degrees and skin pitting, left hand small finger with proximal interphalangeal joint contracture of 45 degrees.  Full composite fist formation noted, no instability noted.  Neurologically intact.      _____________________________________________________  STUDIES REVIEWED:  No Studies to review      PROCEDURES PERFORMED:  Procedures  No Procedures performed today    "

## 2024-08-07 DIAGNOSIS — K27.9 PUD (PEPTIC ULCER DISEASE): ICD-10-CM

## 2024-08-07 RX ORDER — OMEPRAZOLE 20 MG/1
20 CAPSULE, DELAYED RELEASE ORAL DAILY
Qty: 90 CAPSULE | Refills: 1 | Status: SHIPPED | OUTPATIENT
Start: 2024-08-07

## 2024-10-03 ENCOUNTER — TELEPHONE (OUTPATIENT)
Age: 64
End: 2024-10-03

## 2024-10-03 NOTE — TELEPHONE ENCOUNTER
Caller: [employer]    Doctor: Briana    Reason for call: A CPT code is being request for the patient SX on 10/17.  Please advise  Thank you    Call back#: [phone]

## 2024-10-14 ENCOUNTER — TELEPHONE (OUTPATIENT)
Age: 64
End: 2024-10-14

## 2024-10-14 ENCOUNTER — TELEPHONE (OUTPATIENT)
Dept: OBGYN CLINIC | Facility: CLINIC | Age: 64
End: 2024-10-14

## 2024-10-14 NOTE — TELEPHONE ENCOUNTER
Called [employer] per her request and rescheduled SX for Dec 19. Patient is aware, also rescheduled her PO in [place] for Dec 27.

## 2024-10-14 NOTE — TELEPHONE ENCOUNTER
Caller: Patient    Doctor: Briana    Reason for call: Patient is not feeling well. Request reschedule sx. Please cb to discuss    Call back#: [phone]

## 2024-12-09 ENCOUNTER — VBI (OUTPATIENT)
Dept: ADMINISTRATIVE | Facility: OTHER | Age: 64
End: 2024-12-09

## 2024-12-10 NOTE — TELEPHONE ENCOUNTER
12/09/24 7:34 PM     Chart reviewed for CRC: Colonoscopy ; nothing is submitted to the patient's insurance at this time.     Amira Barber MA   [facility]

## 2024-12-12 ENCOUNTER — VBI (OUTPATIENT)
Dept: ADMINISTRATIVE | Facility: OTHER | Age: 64
End: 2024-12-12

## 2024-12-12 NOTE — TELEPHONE ENCOUNTER
12/12/24 2:44 PM     Chart reviewed for Mammogram ; nothing is submitted to the patient's insurance at this time.     Amira Barber MA   [facility]

## 2025-02-13 DIAGNOSIS — K27.9 PUD (PEPTIC ULCER DISEASE): ICD-10-CM

## 2025-02-28 DIAGNOSIS — Z12.31 ENCOUNTER FOR SCREENING MAMMOGRAM FOR MALIGNANT NEOPLASM OF BREAST: Primary | ICD-10-CM

## 2025-03-17 ENCOUNTER — PREP FOR PROCEDURE (OUTPATIENT)
Dept: OBGYN CLINIC | Facility: CLINIC | Age: 65
End: 2025-03-17

## 2025-03-17 ENCOUNTER — OFFICE VISIT (OUTPATIENT)
Dept: OBGYN CLINIC | Facility: CLINIC | Age: 65
End: 2025-03-17
Payer: MEDICARE

## 2025-03-17 VITALS — BODY MASS INDEX: 23.03 KG/M2 | HEIGHT: 61 IN | WEIGHT: 122 LBS

## 2025-03-17 DIAGNOSIS — M72.0 DUPUYTREN'S CONTRACTURE OF LEFT HAND: Primary | ICD-10-CM

## 2025-03-17 PROCEDURE — 99213 OFFICE O/P EST LOW 20 MIN: CPT | Performed by: ORTHOPAEDIC SURGERY

## 2025-03-17 NOTE — PROGRESS NOTES
ORTHOPAEDIC HAND, WRIST, AND ELBOW OFFICE  VISIT     Name: Ines Ruggiero      : 1960      MRN: [id number]  Encounter Provider: Siddhartha Warren MD  Encounter Date: 3/17/2025   Encounter department: [facility]  :  Assessment & Plan  Dupuytren's contracture of left hand  Conservative and operative treatments were discussed with the patient  She would like to proceed with surgery  She was scheduled for surgical intervention however she was unable to get a ride to surgery  We will reschedule her for left hand long, ring and small finger Dupuytren's excision with regional anesthesia  Risk and benefits were discussed  She will follow-up after surgery for suture removal  Orders:  •  Case request operating room: Left long, ring and small finger dupuytrens excision; Standing  •  Ambulatory Referral to PT/OT Hand Therapy; Future            Operative Discussions:  Standard Consent: The risks and benefits of the procedure were explained to the patient, which include, but are not limited to: Bleeding, infection, recurrence, pain, scar, damage to tendons, damage to nerves, and damage to blood vessels, failure to give desired results and complications related to anesthesia.  These risks, along with alternative conservative treatment options, and postoperative protocols were voiced back and understood by the patient.  All questions were answered to the patient's satisfaction.  The patient agrees to comply with a standard postoperative protocol, and is willing to proceed.  Education was provided via written and auditory forms.  There were no barriers to learning. Written handouts regarding wound care, incision and scar care, and general preoperative information was provided to the patient.  Prior to surgery, the patient may be requested to stop all anti-inflammatory medications.  Prophylactic aspirin, Plavix, and Coumadin may be allowed to be continued.  Medications  "including vitamin E., ginkgo, and fish oil are requested to be stopped approximately one week prior to surgery.  Hypertensive medications and beta blockers, if taken, should be continued.    History of Present Illness   HPI  Chief Complaint   Patient presents with   • Left Hand - Pain     Ring TF- 1st injection 3/5/24 by Dr. Hillary Montes's        Ines Ruggiero is a 65 y.o. female who presents for evaluation regarding left hand Dupuytren's disease.  She was previously scheduled for surgical intervention however she was not able to obtain a ride.  She would like to reschedule her surgery at this time.      REVIEW OF SYSTEMS:  General: no fever, no chills  HEENT:  No loss of hearing or eyesight problems  Eyes:  No red eyes  Respiratory:  No coughing, shortness of breath or wheezing  Cardiovascular:  No chest pain, no palpitations  GI:  Abdomen soft nontender, denies nausea  Endocrine:  No muscle weakness, no frequent urination, no excessive thirst  Urinary:  No dysuria, no incontinence  Musculoskeletal: see HPI and PE  SKIN:  No skin rash, no dry skin  Neurological:  No headaches, no confusion  Psychiatric:  No suicide thoughts, no anxiety, no depression  Review of all other systems is negative    Objective   Ht 5' 1\" (1.549 m)   Wt 55.3 kg (122 lb)   LMP  (LMP Unknown)   BMI 23.05 kg/m²      General: well developed and well nourished, alert, oriented times 3, and appears comfortable  Psychiatric: Normal  HEENT: Trachea Midline, No torticollis  Cardiovascular: No discernable arrhythmia  Pulmonary: No wheezing or stridor  Abdomen: No rebound or guarding  Extremities: No peripheral edema  Skin: No masses, erythema, lacerations, fluctation, ulcerations  Neurovascular: Sensation Intact to the Median, Ulnar, Radial Nerve, Motor Intact to the Median, Ulnar, Radial Nerve, and Pulses Intact    Musculoskeletal exam:  Left hand  Pretendinous cord noted into the long, ring and small finger.  Long finger flexion " contracture of 20 degrees ring finger flexion contracture of 20 degrees small finger has an ulnar spiral cord with a flexion contracture of 45 degrees.      STUDIES REVIEWED:  No Studies to review      PROCEDURES PERFORMED:  Procedures  No Procedures performed today      Scribe Attestation    I,:  Bayron Ricketts PA-C am acting as a scribe while in the presence of the attending physician.:       I,:  Siddhartha Warren MD personally performed the services described in this documentation    as scribed in my presence.:

## 2025-03-17 NOTE — ASSESSMENT & PLAN NOTE
Conservative and operative treatments were discussed with the patient  She would like to proceed with surgery  She was scheduled for surgical intervention however she was unable to get a ride to surgery  We will reschedule her for left hand long, ring and small finger Dupuytren's excision with regional anesthesia  Risk and benefits were discussed  She will follow-up after surgery for suture removal  Orders:  •  Case request operating room: Left long, ring and small finger dupuytrens excision; Standing  •  Ambulatory Referral to PT/OT Hand Therapy; Future

## 2025-05-29 ENCOUNTER — ANESTHESIA EVENT (OUTPATIENT)
Dept: PERIOP | Facility: HOSPITAL | Age: 65
End: 2025-05-29
Payer: MEDICARE

## 2025-05-29 ENCOUNTER — ANESTHESIA (OUTPATIENT)
Dept: PERIOP | Facility: HOSPITAL | Age: 65
End: 2025-05-29
Payer: MEDICARE

## 2025-05-29 ENCOUNTER — HOSPITAL ENCOUNTER (OUTPATIENT)
Facility: HOSPITAL | Age: 65
Setting detail: OUTPATIENT SURGERY
Discharge: HOME | End: 2025-05-29
Attending: ORTHOPAEDIC SURGERY | Admitting: ORTHOPAEDIC SURGERY
Payer: MEDICARE

## 2025-05-29 VITALS
SYSTOLIC BLOOD PRESSURE: 155 MMHG | TEMPERATURE: 97.6 F | DIASTOLIC BLOOD PRESSURE: 70 MMHG | OXYGEN SATURATION: 97 % | HEIGHT: 61 IN | BODY MASS INDEX: 23.79 KG/M2 | WEIGHT: 126 LBS | HEART RATE: 86 BPM | RESPIRATION RATE: 20 BRPM

## 2025-05-29 DIAGNOSIS — M72.0 DUPUYTREN'S CONTRACTURE OF LEFT HAND: ICD-10-CM

## 2025-05-29 DIAGNOSIS — M72.0 DUPUYTREN'S CONTRACTURE: Primary | ICD-10-CM

## 2025-05-29 PROCEDURE — 88304 TISSUE EXAM BY PATHOLOGIST: CPT | Performed by: STUDENT IN AN ORGANIZED HEALTH CARE EDUCATION/TRAINING PROGRAM

## 2025-05-29 PROCEDURE — 26123 RELEASE PALM CONTRACTURE: CPT | Performed by: PHYSICIAN ASSISTANT

## 2025-05-29 PROCEDURE — 26123 RELEASE PALM CONTRACTURE: CPT | Performed by: ORTHOPAEDIC SURGERY

## 2025-05-29 PROCEDURE — NC001 PR NO CHARGE: Performed by: ORTHOPAEDIC SURGERY

## 2025-05-29 PROCEDURE — 26125 RELEASE PALM CONTRACTURE: CPT | Performed by: ORTHOPAEDIC SURGERY

## 2025-05-29 PROCEDURE — 26125 RELEASE PALM CONTRACTURE: CPT | Performed by: PHYSICIAN ASSISTANT

## 2025-05-29 RX ORDER — PROPOFOL 10 MG/ML
INJECTION, EMULSION INTRAVENOUS CONTINUOUS PRN
Status: DISCONTINUED | OUTPATIENT
Start: 2025-05-29 | End: 2025-05-29

## 2025-05-29 RX ORDER — BUPIVACAINE HYDROCHLORIDE 5 MG/ML
INJECTION, SOLUTION EPIDURAL; INTRACAUDAL; PERINEURAL AS NEEDED
Status: DISCONTINUED | OUTPATIENT
Start: 2025-05-29 | End: 2025-05-29

## 2025-05-29 RX ORDER — EPHEDRINE SULFATE 50 MG/ML
INJECTION INTRAVENOUS AS NEEDED
Status: DISCONTINUED | OUTPATIENT
Start: 2025-05-29 | End: 2025-05-29

## 2025-05-29 RX ORDER — FENTANYL CITRATE 50 UG/ML
INJECTION, SOLUTION INTRAMUSCULAR; INTRAVENOUS AS NEEDED
Status: DISCONTINUED | OUTPATIENT
Start: 2025-05-29 | End: 2025-05-29

## 2025-05-29 RX ORDER — SENNOSIDES 8.6 MG
650 CAPSULE ORAL EVERY 8 HOURS PRN
Qty: 30 TABLET | Refills: 0 | Status: SHIPPED | OUTPATIENT
Start: 2025-05-29

## 2025-05-29 RX ORDER — SODIUM CHLORIDE 9 MG/ML
INJECTION, SOLUTION INTRAVENOUS CONTINUOUS PRN
Status: DISCONTINUED | OUTPATIENT
Start: 2025-05-29 | End: 2025-05-29

## 2025-05-29 RX ORDER — MIDAZOLAM HYDROCHLORIDE 2 MG/2ML
INJECTION, SOLUTION INTRAMUSCULAR; INTRAVENOUS AS NEEDED
Status: DISCONTINUED | OUTPATIENT
Start: 2025-05-29 | End: 2025-05-29

## 2025-05-29 RX ORDER — ACETAMINOPHEN 325 MG/1
650 TABLET ORAL EVERY 6 HOURS PRN
Status: DISCONTINUED | OUTPATIENT
Start: 2025-05-29 | End: 2025-05-29 | Stop reason: HOSPADM

## 2025-05-29 RX ORDER — ONDANSETRON 2 MG/ML
INJECTION INTRAMUSCULAR; INTRAVENOUS AS NEEDED
Status: DISCONTINUED | OUTPATIENT
Start: 2025-05-29 | End: 2025-05-29

## 2025-05-29 RX ORDER — COVID-19 ANTIGEN TEST
220 KIT MISCELLANEOUS 2 TIMES DAILY
Qty: 60 CAPSULE | Refills: 0 | Status: SHIPPED | OUTPATIENT
Start: 2025-05-29 | End: 2025-06-28

## 2025-05-29 RX ORDER — METOCLOPRAMIDE HYDROCHLORIDE 5 MG/ML
10 INJECTION INTRAMUSCULAR; INTRAVENOUS ONCE AS NEEDED
Status: DISCONTINUED | OUTPATIENT
Start: 2025-05-29 | End: 2025-05-29 | Stop reason: HOSPADM

## 2025-05-29 RX ORDER — CEFAZOLIN SODIUM 2 G/50ML
2000 SOLUTION INTRAVENOUS ONCE
Status: COMPLETED | OUTPATIENT
Start: 2025-05-29 | End: 2025-05-29

## 2025-05-29 RX ORDER — DEXAMETHASONE SODIUM PHOSPHATE 10 MG/ML
INJECTION, SOLUTION INTRAMUSCULAR; INTRAVENOUS AS NEEDED
Status: DISCONTINUED | OUTPATIENT
Start: 2025-05-29 | End: 2025-05-29

## 2025-05-29 RX ORDER — FENTANYL CITRATE/PF 50 MCG/ML
50 SYRINGE (ML) INJECTION
Status: DISCONTINUED | OUTPATIENT
Start: 2025-05-29 | End: 2025-05-29 | Stop reason: HOSPADM

## 2025-05-29 RX ORDER — ONDANSETRON 2 MG/ML
4 INJECTION INTRAMUSCULAR; INTRAVENOUS ONCE AS NEEDED
Status: DISCONTINUED | OUTPATIENT
Start: 2025-05-29 | End: 2025-05-29 | Stop reason: HOSPADM

## 2025-05-29 RX ORDER — LIDOCAINE HYDROCHLORIDE AND EPINEPHRINE 10; 10 MG/ML; UG/ML
INJECTION, SOLUTION INFILTRATION; PERINEURAL AS NEEDED
Status: DISCONTINUED | OUTPATIENT
Start: 2025-05-29 | End: 2025-05-29 | Stop reason: HOSPADM

## 2025-05-29 RX ORDER — TRAMADOL HYDROCHLORIDE 50 MG/1
50 TABLET ORAL EVERY 6 HOURS PRN
Status: DISCONTINUED | OUTPATIENT
Start: 2025-05-29 | End: 2025-05-29 | Stop reason: HOSPADM

## 2025-05-29 RX ORDER — ONDANSETRON 2 MG/ML
4 INJECTION INTRAMUSCULAR; INTRAVENOUS EVERY 6 HOURS PRN
Status: DISCONTINUED | OUTPATIENT
Start: 2025-05-29 | End: 2025-05-29 | Stop reason: HOSPADM

## 2025-05-29 RX ADMIN — CEFAZOLIN SODIUM 2000 MG: 2 SOLUTION INTRAVENOUS at 11:20

## 2025-05-29 RX ADMIN — FENTANYL CITRATE 100 MCG: 50 INJECTION, SOLUTION INTRAMUSCULAR; INTRAVENOUS at 10:08

## 2025-05-29 RX ADMIN — DEXAMETHASONE SODIUM PHOSPHATE 10 MG: 10 INJECTION, SOLUTION INTRAMUSCULAR; INTRAVENOUS at 11:18

## 2025-05-29 RX ADMIN — EPHEDRINE SULFATE 5 MG: 50 INJECTION INTRAVENOUS at 11:30

## 2025-05-29 RX ADMIN — ONDANSETRON 4 MG: 2 INJECTION INTRAMUSCULAR; INTRAVENOUS at 11:18

## 2025-05-29 RX ADMIN — PROPOFOL 100 MCG/KG/MIN: 10 INJECTION, EMULSION INTRAVENOUS at 11:19

## 2025-05-29 RX ADMIN — PROPOFOL 30 MG: 10 INJECTION, EMULSION INTRAVENOUS at 11:18

## 2025-05-29 RX ADMIN — BUPIVACAINE 20 ML: 13.3 INJECTION, SUSPENSION, LIPOSOMAL INFILTRATION at 10:10

## 2025-05-29 RX ADMIN — SODIUM CHLORIDE: 0.9 INJECTION, SOLUTION INTRAVENOUS at 11:14

## 2025-05-29 RX ADMIN — BUPIVACAINE HYDROCHLORIDE 7.5 ML: 5 INJECTION, SOLUTION EPIDURAL; INTRACAUDAL; PERINEURAL at 10:10

## 2025-05-29 RX ADMIN — MIDAZOLAM 2 MG: 1 INJECTION INTRAMUSCULAR; INTRAVENOUS at 10:08

## 2025-05-29 RX ADMIN — EPHEDRINE SULFATE 10 MG: 50 INJECTION INTRAVENOUS at 11:19

## 2025-05-29 RX ADMIN — EPHEDRINE SULFATE 10 MG: 50 INJECTION INTRAVENOUS at 11:21

## 2025-05-29 NOTE — OP NOTE
OPERATIVE REPORT  PATIENT NAME: Ines Ruggiero  :  1960  MRN: [id number]  Pt Location: [facility] MAIN OR    SURGERY DATE: 25    Surgeons and Role:     * Siddhartha Warren MD - Primary     * Bayron Ricketts PA-C - Assisting    Pre-Op Diagnosis:  Dupuytren's contracture of left hand [M72.0]    Post-Op Diagnosis:  Dupuytren's contracture of left hand [M72.0]    Procedure(s) (LRB):  Left long, ring and small finger dupuytrens excision with release of the metacarpophalangeal joint of the long and ring finger and the proximal interphalangeal joint of the small finger (Left)  Application short arm splint (Left)    Specimen(s):  Order Name Source Comment Collection Info Order Time   TISSUE EXAM Dupuytren's Contracture Tissue Left Dupuytrens Collected By: Siddhartha Warren MD 2025 11:35 AM     Release to patient through Mychart   Immediate            Estimated Blood Loss:   Minimal      Anesthesia Type:   Regional with Sedation    Operative Indications:  The patient has a history of Dupuytren's disease of the left hand that was recalcitrant to conservative management.  The decision was made to bring the patient to the operating room for Dupuytren's excision left hand long ring and small finger.  Risks of the procedure were explained which include, but are not limited to bleeding; infection; damage to nerves, arteries,veins, tendons; scar; pain; need for reoperation; failure to give desired result; and risks of anaesthesia.  All questions were answered to satisfaction and they were willing to proceed.         Operative Findings:  Precentral cords to the long and ring finger with contractures of the metacarpal phalangeal joint successfully excised    Ulnar spiral cord to the small finger with contracture of the proximal interphalangeal joint successfully excised    Full digital extension of all digits at the completion of the procedure    Complications:   None    Procedure and Technique:  After the patient,  site, and procedure were identified, the patient was brought into the operating room in a supine position.  Regional and general anaesthesia were provided.  A well padded tourniquet was applied to the extremity, set at 250 mmHg.  The  left upper extremity was then prepped and drapped in a normal, sterile, orthopedic fashion.    After the patient, site, and procedure identified attention was turned towards the left hand.  An Esmarch bandage was used to exsanguinate the limb and the tourniquet was inflated to 250 mmHg.  Brunner shaped incision was made in the palmar aspect of the left hand.  We dissected down through skin and subcutaneous tissues elevating full-thickness flaps.  We identified the radial and ulnar digital arteries and nerves to the long and ring finger as well as this flexor tendons.  These were traced from distal down to proximal.  We identified pretendinous cords to the long and ring finger.  While protecting this the neurovascular structures as well as the flexor tendons and tendon sheaths, the precentral cord to the long and ring finger were then removed.  These were sent for routine pathologic evaluation.  We were then able to extend the metacarpal phalangeal joint of the long and ring finger in their entirety.  Attention was then turned towards the small finger.  Brunner shaped incision was then made.  This was centered over the proximal interphalangeal joint and we dissected down through the skin and subcutaneous tissues.  We identified the central flexor tendon as well as the ulnar digital artery and nerve.  A spiral cord was identified wrapping around the artery and nerve on the ulnar side.  While protecting the flexor tendon as well as the artery and the nerve, the ulnar spiral cord was excised in its entirety and sent for routine pathologic evaluation.  We were then able to fully extend the proximal interphalangeal joint.  Tourniquet was deflated demonstrating rapid restoration of blood  supply to the fingers.    At the completion of the procedure, hemostasis was obtained with cautery and direct pressure.  The wounds were copiously irrigated with sterile solution.  The wounds were closed with Prolene.  Sterile dressings were applied, including Xeroform, gauze, tweeners, webril, ACE and Volar Splint.  Please note, all sponge, needle, and instrument counts were correct prior to closure.  Loupe magnification was utilized.   The patient tolerated the procedure well.     I was present for all critical portions of the procedure., A qualified resident physician was not available., and A physician assistant was required during the procedure for retraction, tissue handling, dissection and suturing.    Patient Disposition:  PACU , hemodynamically stable, and extubated and stable    SIGNATURE: Siddhartha Warren MD  DATE: 05/29/25  TIME: 11:58 AM

## 2025-05-29 NOTE — H&P
"H&P Exam - Orthopedics   Ines Ruggiero 65 y.o. female MRN: [id number]  Unit/Bed#: [facility] 05    Assessment/Plan   Assessment:  Left long, ring and small finger Dupuytren's disease    Plan:  Left long, ring and small finger Dupuytren's excision with regional anesthesia    History of Present Illness   HPI:  Ines Ruggiero is a 65 y.o. female who presents with left long, ring and small finger Dupuytren's disease.  She states it is affecting her activities of daily living and would like to proceed with surgical intervention.    Historical Information  Review Of Systems:   Skin: Normal  Neuro: See HPI  Musculoskeletal: See HPI  14 point review of systems negative except as stated above     Past Medical History:   Past Medical History[1]    Past Surgical History:   Past Surgical History[2]    Family History:  Family history reviewed and non-contributory  Family History[3]    Social History:  Social History[4]    Allergies:   Allergies[5]        Labs:  0   Lab Value Date/Time    HCT 36.5 02/16/2023 0933    HGB 11.7 02/16/2023 0933    WBC 5.6 02/16/2023 0933    CRP 30.2 (H) 02/16/2023 0933       Meds:  Current Medications[6]    Blood Culture:   No results found for: \"BLOODCX\"    Wound Culture:   No results found for: \"WOUNDCULT\"    Ins and Outs:  No intake/output data recorded.            Physical Exam  /83   Pulse 77   Temp 97.7 °F (36.5 °C) (Temporal)   Resp 18   Ht 5' 1\" (1.549 m)   Wt 57.2 kg (126 lb)   LMP  (LMP Unknown)   SpO2 98%   BMI 23.81 kg/m²   /83   Pulse 77   Temp 97.7 °F (36.5 °C) (Temporal)   Resp 18   Ht 5' 1\" (1.549 m)   Wt 57.2 kg (126 lb)   LMP  (LMP Unknown)   SpO2 98%   BMI 23.81 kg/m²   Gen: No acute distress, resting comfortably in bed  HEENT: Eyes clear, moist mucus membranes, hearing intact  Respiratory: No audible wheezing or stridor  Cardiovascular: Well Perfused peripherally, 2+ distal pulse  Abdomen: nondistended, no peritoneal signs  Ortho Exam: Left " hand  Pretendinous cord noted into the long, ring and small finger, long finger flexion contracture 20 degrees ring finger flexion contracture 20 degrees small finger has an ulnar spiral cord with a flexion contracture of 45 degrees    Neuro Exam: Patient is neurovascularly intact from a median, radial and ulnar nerve distribution. Capillary refill less than 2 seconds.       Lab Results: Reviewed  Imaging: Reviewed         [1]   Past Medical History:  Diagnosis Date    Chronic low back pain     DDD (degenerative disc disease), lumbar     Perforated peptic ulcer (HCC)     Trigger middle finger of right hand 4/20/2023   [2]   Past Surgical History:  Procedure Laterality Date    ABDOMINAL SURGERY  2004    for perforated peptic ulcer   [3]   Family History  Problem Relation Name Age of Onset    Gout Father          Passsed away at 90, had gout, but only in old age.   [4]   Social History  Socioeconomic History    Marital status:    Tobacco Use    Smoking status: Every Day     Current packs/day: 0.50     Average packs/day: 0.5 packs/day for 45.4 years (22.7 ttl pk-yrs)     Types: Cigarettes     Start date: 1/1/1980    Smokeless tobacco: Never   Vaping Use    Vaping status: Never Used   Substance and Sexual Activity    Alcohol use: Never    Drug use: Never    Sexual activity: Not Currently   Social History Narrative    Recently moved to [place] from [place]    2 daughters, one in [place] and one in [place]    Lives alone with her cat   [5] No Known Allergies  [6]   Current Facility-Administered Medications:     bupivacaine liposomal (EXPAREL) 1.3 % injection 20 mL, 20 mL, Infiltration, Once, Carroll Thompson MD    ceFAZolin (ANCEF) IVPB (premix in dextrose) 2,000 mg 50 mL, 2,000 mg, Intravenous, Once, Bayron Ricketts PA-C

## 2025-05-29 NOTE — ANESTHESIA POSTPROCEDURE EVALUATION
Post-Op Assessment Note    CV Status:  Stable    Pain management: adequate       Mental Status:  Alert and awake   Hydration Status:  Euvolemic and stable   PONV Controlled:  None   Airway Patency:  Patent     Post Op Vitals Reviewed: Yes    No anethesia notable event occurred.    Staff: Anesthesiologist           Last Filed PACU Vitals:  Vitals Value Taken Time   Temp 97.6 °F (36.4 °C) 05/29/25 12:40   Pulse 86 05/29/25 13:00   /83 05/29/25 13:00   Resp 22 05/29/25 13:00   SpO2 95 % 05/29/25 13:00       Modified Ely:     Vitals Value Taken Time   Activity 2 05/29/25 12:30   Respiration 2 05/29/25 12:30   Circulation 2 05/29/25 12:30   Consciousness 2 05/29/25 12:30   Oxygen Saturation 2 05/29/25 12:30     Modified Ely Score: 10

## 2025-05-29 NOTE — ANESTHESIA POSTPROCEDURE EVALUATION
Post-Op Assessment Note    CV Status:  Stable  Pain Score: 0    Pain management: adequate    Multimodal analgesia used between 6 hours prior to anesthesia start to PACU discharge    Mental Status:  Alert and awake   Hydration Status:  Euvolemic and stable   PONV Controlled:  Controlled   Airway Patency:  Patent  Two or more mitigation strategies used for obstructive sleep apnea   Post Op Vitals Reviewed: Yes    No anethesia notable event occurred.    Staff: CRNA           Last Filed PACU Vitals:  Vitals Value Taken Time   Temp 97    Pulse 78    /83    Resp 12    SpO2 96

## 2025-05-29 NOTE — ANESTHESIA PREPROCEDURE EVALUATION
Procedure:  Left long, ring and small finger dupuytrens excision (Left: Hand)    Relevant Problems   MUSCULOSKELETAL   (+) Arthritis of left foot   (+) Gouty arthritis   (+) Primary osteoarthritis of right knee      NEURO/PSYCH   (+) Chronic pain    Chronic Narcotic use  Every Day - Pack years: 22.7   Physical Exam    Airway     Mallampati score: II  TM Distance: >3 FB  Neck ROM: full      Cardiovascular      Dental       Pulmonary      Neurological      Other Findings  post-pubertal.      Anesthesia Plan  ASA Score- 3     Anesthesia Type- IV sedation with anesthesia with ASA Monitors.         Additional Monitors:     Airway Plan:     Comment: SC Block for post op pain per surgeon request.   Taking MSContin 30mg twice per day..       Plan Factors-    Chart reviewed.                      Induction-     Postoperative Plan- .   Monitoring Plan - Monitoring plan - standard ASA monitoring  Post Operative Pain Plan - peripheral nerve block        Informed Consent- Anesthetic plan and risks discussed with patient.  I personally reviewed this patient with the CRNA. Discussed and agreed on the Anesthesia Plan with the CRNA..      NPO Status:  No vitals data found for the desired time range.

## 2025-05-29 NOTE — ANESTHESIA PROCEDURE NOTES
Peripheral Block    Patient location during procedure: holding area  Start time: 5/29/2025 10:10 AM  Reason for block: at surgeon's request and post-op pain management  Staffing  Performed by: Carroll Thompson MD  Authorized by: Carroll Thompson MD    Preanesthetic Checklist  Completed: patient identified, IV checked, site marked, risks and benefits discussed, surgical consent, monitors and equipment checked, pre-op evaluation and timeout performed  Peripheral Block  Patient position: supine  Prep: ChloraPrep  Patient monitoring: frequent blood pressure checks, continuous pulse oximetry and heart rate  Block type: Supraclavicular  Laterality: left  Injection technique: single-shot  Procedures: ultrasound guided, Ultrasound guidance required for the procedure to increase accuracy and safety of medication placement and decrease risk of complications.  Ultrasound permanent image saved    Needle  Needle type: Stimuplex   Needle gauge: 22 G  Needle length: 2 in  Needle localization: anatomical landmarks and ultrasound guidance  Assessment  Injection assessment: incremental injection, frequent aspiration, injected with ease, negative aspiration, negative for heart rate change, no paresthesia on injection, no symptoms of intraneural/intravenous injection and needle tip visualized at all times  Paresthesia pain: none  Post-procedure:  site cleaned  patient tolerated the procedure well with no immediate complications

## 2025-05-29 NOTE — DISCHARGE INSTR - AVS FIRST PAGE
Post Operative Instructions    PLEASE SCHEDULE OCCUPATIONAL THERAPY APPOINTMENT FOR 3-5 DAYS AFTER SURGERY FOR  WOUND EVALUATION AND CUSTOM NIGHTTIME EXTENSION SPLINT. PHONE NUMBER [phone]    You have had surgery on your arm today, please read and follow the information below:  Elevate your hand above your elbow during the next 24-48 hours to help with swelling.  Place your hand and arm over your head with motion at your shoulder three times a day.  Do not apply any cream/ointment/oil to your incisions including antibiotics.  Do not soak your hands in standing water (dishwater, tubs, Jacuzzi's, pools, etc.) until given permission (typically 2-3 weeks after injury)    Call the office if you notice any:  Increased numbness or tingling of your hand or fingers that is not relieved with elevation.  Increasing pain that is not controlled with medication.  Difficulty chewing, breathing, swallowing.  Chest pains or shortness of breath.  Fever over 101.4 degrees.    Bandage: Your therapist will remove your bandage at your first therapy appointment.    Motion: Move fingers into a fist 5 times a day, DO NOT move any splinted fingers.    Weight bearing status: The operated extremity should be non-weight bearing until further notice.    Ice: Ice for 10 minutes every hour as needed for swelling x 24 hours.    Sling: Please use your sling while your arm is numb from the block. When your arm is FULLY awake again, you no longer need this and may use your sling as needed for comfort. While using the sling, make sure to move your shoulder throughout the day to prevent stiffness here.     Medications:   Naproxen 220 mg two times a day   Tylenol Extended Release 650 mg every 8 hours  Please reach out to your pain management doctor for any additional pain medications.     After surgery, we would like you to take naproxen 220 mg one tablet by mouth every 12 hours with food  AND Tylenol 650 mg one tablet by mouth every 8 hours  (at  breakfast, lunch and dinner) for 3-5 days after your surgery.  Please take these medication EVERYDAY after surgery for 3-5 days, and not just as needed. You can take these medications at the same time.  Taking these medications after surgery will limit your need for prescription pain medication.      We will also prescribe a narcotic pain medication for a limited time after surgery that you can take as needed for moderate or severe pain.      Follow-up Appointment: 7-10 days.      Please call the office if you have any questions or concerns regarding your post-operative care.

## 2025-06-02 ENCOUNTER — RESULTS FOLLOW-UP (OUTPATIENT)
Dept: OBGYN CLINIC | Facility: CLINIC | Age: 65
End: 2025-06-02

## 2025-06-02 PROCEDURE — 88304 TISSUE EXAM BY PATHOLOGIST: CPT | Performed by: STUDENT IN AN ORGANIZED HEALTH CARE EDUCATION/TRAINING PROGRAM

## 2025-06-09 ENCOUNTER — OFFICE VISIT (OUTPATIENT)
Dept: OBGYN CLINIC | Facility: CLINIC | Age: 65
End: 2025-06-09

## 2025-06-09 ENCOUNTER — OFFICE VISIT (OUTPATIENT)
Dept: OCCUPATIONAL THERAPY | Facility: CLINIC | Age: 65
End: 2025-06-09
Attending: ORTHOPAEDIC SURGERY
Payer: MEDICARE

## 2025-06-09 VITALS — HEIGHT: 61 IN | BODY MASS INDEX: 23.79 KG/M2 | WEIGHT: 126 LBS

## 2025-06-09 DIAGNOSIS — M72.0 DUPUYTREN'S CONTRACTURE OF LEFT HAND: Primary | ICD-10-CM

## 2025-06-09 PROCEDURE — 99024 POSTOP FOLLOW-UP VISIT: CPT | Performed by: ORTHOPAEDIC SURGERY

## 2025-06-09 PROCEDURE — 97760 ORTHOTIC MGMT&TRAING 1ST ENC: CPT | Performed by: OCCUPATIONAL THERAPIST

## 2025-06-09 NOTE — PROGRESS NOTES
Splint    Diagnosis:   1. Dupuytren's contracture of left hand           Indication: Night splint    Location: Left  hand  Supplies: Orthotics  Thermoplastic material    Splint type: HB extension splint  Wearing Schedule: Wear at night  Describe Position: Hand based all digits in full extension    Precautions: Open wound    Patient or Caregiver expresses understanding of wearing Schedule and Precautions? Yes  Patient or Caregiver able to don/doff orthotic independently?Yes    Written orders provided to patient? Yes  Orders Obtained: Written  Orders Obtained from: Dr. Warren

## 2025-06-09 NOTE — PROGRESS NOTES
"    ORTHOPAEDIC HAND, WRIST, AND ELBOW OFFICE  VISIT     Name: Ines Ruggiero      : 1960      MRN: [id number]  Encounter Provider: Siddhartha Warren MD  Encounter Date: 2025   Encounter department: [facility]  :  Assessment & Plan  Dupuytren's contracture of left hand            History of Present Illness   HPI  Chief Complaint   Patient presents with   • Left Hand - Post-op, Suture / Staple Removal     Dupuytren's Excision - 25       SUBJECTIVE:  Ines Ruggiero is a 65 y.o. female who presents for follow up after Left long, ring and small finger dupuytrens excision with release of the metacarpophalangeal joint of the long and ring finger and the proximal interphalangeal joint of the small finger - Left and Application short arm splint - Left on 2025.  Today patient has minimal complaint of pain. She was not currently taking any medications for pain relief, specifically in regards to her hand. She notes that she does take morphine for spine related issues on a regular basis. She denies any new onset numbness, tingling, or mechanical symptoms. She is very satisfied with her new ability to fully extend her small finger. She does report swelling and stiffness of the fingers.      PHYSICAL EXAMINATION:  Vital signs: Ht 5' 1\" (1.549 m)   Wt 57.2 kg (126 lb)   LMP  (LMP Unknown)   BMI 23.81 kg/m²   General: well developed and well nourished, alert, oriented times 3, and appears comfortable  Psychiatric: Normal    MUSCULOSKELETAL EXAMINATION:  Left hand-  Incision: clean, dry, swelling present, and edges well-approximated with sutures, no signs of drainage or dehiscence  Range of Motion: Limited due to stiffness and opposition intact  Neurovascular status: Neuro intact, good cap refill and radial pulse 2+  Activity Restrictions: Begin range of motion immediately, splinting at night.  No soaking or submerging until the weekend, begin lotion and on " Wednesday.  Done today: Sutures out      STUDIES REVIEWED:  No Studies to review      PROCEDURES PERFORMED:  Procedures  -     Scribe Attestation    I,:  Prosper Boogie am acting as a scribe while in the presence of the attending physician.:       I,:  Siddhartha Warren MD personally performed the services described in this documentation    as scribed in my presence.:          Scribe Attestation    I,:  Prosper Boogie am acting as a scribe while in the presence of the attending physician.:       I,:  Siddhartha Warren MD personally performed the services described in this documentation    as scribed in my presence.:

## 2025-06-13 ENCOUNTER — EVALUATION (OUTPATIENT)
Dept: OCCUPATIONAL THERAPY | Facility: CLINIC | Age: 65
End: 2025-06-13
Attending: ORTHOPAEDIC SURGERY
Payer: MEDICARE

## 2025-06-13 DIAGNOSIS — M72.0 DUPUYTREN'S CONTRACTURE OF LEFT HAND: Primary | ICD-10-CM

## 2025-06-13 PROCEDURE — 97166 OT EVAL MOD COMPLEX 45 MIN: CPT

## 2025-06-13 NOTE — PROGRESS NOTES
OT Evaluation     Today's date: 2025  Patient name: Ines Ruggiero  : 1960  MRN: [id number]  Referring provider: Siddhartha Warren MD  Dx:   Encounter Diagnosis     ICD-10-CM    1. Dupuytren's contracture of left hand  M72.0           Start Time: 0900  Stop Time: 0945  Total time in clinic (min): 45 minutes    Assessment  Impairments: abnormal or restricted ROM, activity intolerance, impaired physical strength, lacks appropriate home exercise program, pain with function and weight-bearing intolerance  Symptom irritability: low  Understanding of Dx/Px/POC: good     Prognosis: good    Goals  Short Term Goals by 2 - 4 weeks:    Establish HEP to increase performance with daily activities.     Decreased Edema of MCP circumference by at least 1 cm to assist in completing ADLs.     Patient will demonstrate ability to perform a full digital composite fist of L hand to assist with holding daily functional tasks without difficulty.         Long Term Goals by discharge:    Establish final home exercise program to enhance maximal functional level with ADLs.    Achieve functional active range of motion of LUE for full return to household chores.                             Achieve functional strength of LUE for full return to high level ADLs.               Plan  Patient would benefit from: custom splinting, orthotics, skilled occupational therapy, home program and OT eval  Planned modality interventions: cryotherapy, electrical stimulation/Russian stimulation, TENS, thermotherapy: hydrocollator packs, ultrasound and unattended electrical stimulation    Planned therapy interventions: IASTM, joint mobilization, kinesiology taping, manual therapy, massage, orthotic fitting/training, neuromuscular re-education, orthotic management and training, patient/caregiver education, strengthening, stretching, therapeutic activities, therapeutic exercise, home exercise program, graded activity, graded exercise, functional  "ROM exercises, flexibility and fine motor coordination training    Frequency: 2x week  Duration in weeks: 6  Plan of Care beginning date: 2025  Plan of Care expiration date: 2025  Treatment plan discussed with: patient      Subjective Evaluation    History of Present Illness  Date of surgery: 2025  Mechanism of injury: surgery  Mechanism of injury: Patient is a 65 y.o. RHD female who presents for OT IE and treatment for DOS: 25 Left long, ring, and small finger dupuytrens excision with release of themetacarpophalangeal joint of the long and ring finger and the proximal interphalangeal joint of the small finger. Patient was provided a custom extension digital splint for nighttime on 25 from OT hands. Patient notes compliance with wearing the extension splint at nighttime. Patient reports no pain, \"just swelling\". Patient referred by Dr. Warren to initiate treatment including hand therapy.     Quality of life: good    Patient Goals  Patient goals for therapy: decreased edema, decreased pain, increased motion, increased strength and return to sport/leisure activities    Pain  Current pain ratin  At best pain ratin  At worst pain ratin    Social Support    Employment status: not working  Hand dominance: right          Objective     Observations     Left Wrist/Hand   Positive for incision and wound.     Additional Observation Details  Patient has been completing daily wound care 2x-3x day as instructed. Patient arrived to today's OT IE with band aides on incisions sites.  Incision CDI at this time. No signs of infections observed this date. Patient educated on signs of infection. Patient administered supplies for dressing changes at home and Tubigrip (Size D) to assist with edema management. Patient educated to contact physician if any infections or changes in incisions has occurred. Patient was told to contact [facility] if unable to get a hold of physician and physician " office.      Active Range of Motion     Left Digits    Flexion   Index     MCP: 52    PIP: 62    DIP: 44  Middle     MCP: 62    PIP: 60    DIP: 46  Ring     MCP: 50    PIP: 64    DIP: 24  Little     MCP: 16    PIP: 30    DIP: 14  Extension   Index     MCP: -12    PIP: 0    DIP: 0  Middle     MCP: -26    PIP: 0    DIP: 0  Ring     MCP: -20    PIP: 0    DIP: 0  Little     MCP: -10    PIP: 0    DIP: 0    Additional Active Range of Motion Details  OT IE:  Left Hand  Approximately 4.0 - 4.5 cm from DPC of the left fingers:  Index, middle, rin.0 cm  Small: 4.5 cm    Right Hand  Full digital composite fist      Swelling     Left Wrist/Hand   Circumference MCP: 16.5 cm    Right Wrist/Hand   Circumference MCP: 15.8 cm             Precautions: Universal. Tubigrip Size D.  not needed.   DOS: 25: Left long, ring, and small finger dupuytrens excision with release of themetacarpophalangeal joint of the long and ring finger and the proximal interphalangeal joint of the small finger    Status post Dupuytren's excision left hand long, ring, small finger.  Please fabricate nocturnal brace for extension of all digits.  Begin range of motion exercises with edema control, modalities, progress to strengthening with home exercise program twice per week x 6 weeks.       Insurance:  AMA/CMS Eval/ Re-eval Auth #/ Referral # Total units or visits Start date  Expiration date KX? Visit limitation?  PT only or  PT+OT? Co-Insurance   CMS  Medicare  Copay $0 Eval 25 No Auth Req     [facility] PT/OT 20$                 POC Start Date POC Expiration Date Signed POC?   25        Visit Tracker  Date   IE                                                                                        PMHx:   has a past medical history of Chronic low back pain, DDD (degenerative disc disease), lumbar, Perforated peptic ulcer (HCC), and Trigger middle finger of right hand (2023).       Manuals HEP 2025   STM    x5min                  Ther Ex     Patient education on Dx and HEP  x5min   Flexor tendon glides (straight, hook, fist, tabletop, straight fist) x [id number] each position     [place]   x1 round    High lighter marker hook fist  x [id number]   Towel scrunches x X5-10reps   Wound care management & education  x5min                  Ther Act                     Modalities     MHP  x5min          Assessment:   Patient tolerated session well. Patient demonstrates digital ROM deficits as evidence the patient's inability to make a full composite fist secondary to edema and digit stiffness upon assessment today. Patient session focused on patient education on anatomy and physiology concerning current dx, techniques for decreasing deficits through HEP, and appropriate use of modalities. Patient educated on HEP to include flexor tendon glides, towel scrunches, FMC dexterity, wound care management and edema management  with verbal instructions and handouts for patient reference. Patient educated on treatment plan at this time. Patient benefiting from skilled hand therapy OT to reduce deficits to improve independence with daily activities      Plan:   Focus on AROM, AAROM, PROM, custom orthotic splinting, bracing, wound care management, edema management, UE strengthening when appropriate and all modalities as seen fit to improve ability to complete daily activites with ease.    POC 6/13/25 - 7/25/25

## 2025-06-16 ENCOUNTER — APPOINTMENT (OUTPATIENT)
Dept: OCCUPATIONAL THERAPY | Facility: CLINIC | Age: 65
End: 2025-06-16
Attending: ORTHOPAEDIC SURGERY
Payer: MEDICARE

## 2025-06-19 ENCOUNTER — OFFICE VISIT (OUTPATIENT)
Dept: OCCUPATIONAL THERAPY | Facility: CLINIC | Age: 65
End: 2025-06-19
Attending: ORTHOPAEDIC SURGERY
Payer: MEDICARE

## 2025-06-19 DIAGNOSIS — M72.0 DUPUYTREN'S CONTRACTURE OF LEFT HAND: Primary | ICD-10-CM

## 2025-06-19 PROCEDURE — 97110 THERAPEUTIC EXERCISES: CPT

## 2025-06-19 PROCEDURE — 97140 MANUAL THERAPY 1/> REGIONS: CPT

## 2025-06-19 NOTE — PROGRESS NOTES
Daily Note     Today's date: 2025  Patient name: Iens Ruggiero  : 1960  MRN: [id number]  Referring provider: Siddhartha Warren MD  Dx:   Encounter Diagnosis     ICD-10-CM    1. Dupuytren's contracture of left hand  M72.0                      Subjective: I have no pain. It's swollen and stiff      Objective: See treatment diary below      Assessment: Tolerated treatment well. Patient exhibited good technique with therapeutic exercises. Able to debride some scab from hand. Patient able to make loose composite fist after tx.      Plan: Continue per plan of care.      Precautions: Universal. Tubigrip Size D.  not needed.   DOS: 25: Left long, ring, and small finger dupuytrens excision with release of themetacarpophalangeal joint of the long and ring finger and the proximal interphalangeal joint of the small finger    Status post Dupuytren's excision left hand long, ring, small finger.  Please fabricate nocturnal brace for extension of all digits.  Begin range of motion exercises with edema control, modalities, progress to strengthening with home exercise program twice per week x 6 weeks.       Insurance:  AMA/CMS Eval/ Re-eval Auth #/ Referral # Total units or visits Start date  Expiration date KX? Visit limitation?  PT only or  PT+OT? Co-Insurance   CMS  Medicare  Copay $0 Eval 25 No Auth Req     [facility] PT/OT 20$                 POC Start Date POC Expiration Date Signed POC?   25        Visit Tracker  Date   IE         1 2                                                                              PMHx:   has a past medical history of Chronic low back pain, DDD (degenerative disc disease), lumbar, Perforated peptic ulcer (HCC), and Trigger middle finger of right hand (2023).       Manuals HEP 2025   STM   x5min 10'   Debridement   5'               Ther Ex      Patient education on Dx and HEP  x5min    Flexor tendon glides (alirio  hook, fist, tabletop, straight fist) x [id number] each position   x10   [place]   x1 round  x5   High lighter marker hook fist  x [id number] x30   Towel scrunches x X5-10reps 3x10   Wound care management & education  x5min    AAROM digit flexion   10'   Isolated digit ext   x10         Ther Act                         Modalities      MHP  x5min 5' pre TE           Assessment:   Patient tolerated session well. Patient demonstrates digital ROM deficits as evidence the patient's inability to make a full composite fist secondary to edema and digit stiffness upon assessment today. Patient session focused on patient education on anatomy and physiology concerning current dx, techniques for decreasing deficits through HEP, and appropriate use of modalities. Patient educated on HEP to include flexor tendon glides, towel scrunches, FMC dexterity, wound care management and edema management  with verbal instructions and handouts for patient reference. Patient educated on treatment plan at this time. Patient benefiting from skilled hand therapy OT to reduce deficits to improve independence with daily activities      Plan:   Focus on AROM, AAROM, PROM, custom orthotic splinting, bracing, wound care management, edema management, UE strengthening when appropriate and all modalities as seen fit to improve ability to complete daily activites with ease.    POC 6/13/25 - 7/25/25

## 2025-06-25 ENCOUNTER — OFFICE VISIT (OUTPATIENT)
Dept: OCCUPATIONAL THERAPY | Facility: CLINIC | Age: 65
End: 2025-06-25
Attending: ORTHOPAEDIC SURGERY
Payer: MEDICARE

## 2025-06-25 DIAGNOSIS — M72.0 DUPUYTREN'S CONTRACTURE OF LEFT HAND: Primary | ICD-10-CM

## 2025-06-25 PROCEDURE — 97140 MANUAL THERAPY 1/> REGIONS: CPT

## 2025-06-25 PROCEDURE — 97110 THERAPEUTIC EXERCISES: CPT

## 2025-06-25 NOTE — PROGRESS NOTES
Daily Note     Today's date: 2025  Patient name: Ines Ruggiero  : 1960  MRN: [id number]  Referring provider: Siddhartha Warren MD  Dx:   Encounter Diagnosis     ICD-10-CM    1. Dupuytren's contracture of left hand  M72.0           Start Time: 1355  Stop Time: 1430  Total time in clinic (min): 35 minutes    Subjective: Patient noted some tenderness in thenar eminence and over scars.       Objective: See treatment diary below      Assessment: Tolerated treatment well. Able to achieve full composite flexion with therapist assisted PROM. Patient continues with deficits in passive small finger extension at PIP with noted pain at PIP joint. Patient educated on LLPS to stretch digits at home. Initiated gentle IASTM this date with good tolerance. 50/50 elastomer molded for patient to wear under splint at nighttime. Patient reported understanding. She demonstrated new fine motor coordination/translation activity this date. Patient exhibited good technique with therapeutic exercises.       Plan: Continue per plan of care.      Precautions: Universal. Tubigrip Size D.  not needed.   DOS: 25: Left long, ring, and small finger dupuytrens excision with release of themetacarpophalangeal joint of the long and ring finger and the proximal interphalangeal joint of the small finger       Insurance:  AMA/CMS Eval/ Re-eval Auth #/ Referral # Total units or visits Start date  Expiration date KX? Visit limitation?  PT only or  PT+OT? Co-Insurance   CMS  Medicare  Copay $0 Eval 25 No Auth Req     [facility] PT/OT 20$                 POC Start Date POC Expiration Date Signed POC?   25        Visit Tracker  Date   IE        1 2 3                                                                             PMHx:   has a past medical history of Chronic low back pain, DDD (degenerative disc disease), lumbar, Perforated peptic ulcer (HCC), and Trigger middle finger of right hand  (4/20/2023).       Manuals HEP 6/13/2025 6/19/25 6/25   STM   x5min 10' IASTM 8m   Debridement   5'    50/50 elastomer    x          Ther Ex       Patient education on Dx and HEP  x5min     Flexor tendon glides (straight, hook, fist, tabletop, straight fist) x [id number] each position   x10 x10   [place]   x1 round  x5    High lighter marker hook fist  x [id number] x30 x30   Towel scrunches x X5-10reps 3x10    Wound care management & education  x5min     AAROM digit flexion   10' 5m   Isolated digit ext   x10 x10                 Ther Act        Beads- opp onto board, transl out    x30                 Modalities       MHP  x5min 5' pre TE 5m              POC 6/13/25 - 7/25/25

## 2025-06-26 ENCOUNTER — APPOINTMENT (OUTPATIENT)
Dept: OCCUPATIONAL THERAPY | Facility: CLINIC | Age: 65
End: 2025-06-26
Attending: ORTHOPAEDIC SURGERY
Payer: MEDICARE

## 2025-08-04 ENCOUNTER — OFFICE VISIT (OUTPATIENT)
Dept: OBGYN CLINIC | Facility: CLINIC | Age: 65
End: 2025-08-04

## 2025-08-04 VITALS — BODY MASS INDEX: 23.68 KG/M2 | HEIGHT: 61 IN | WEIGHT: 125.4 LBS

## 2025-08-04 DIAGNOSIS — M65.342 TRIGGER RING FINGER OF LEFT HAND: ICD-10-CM

## 2025-08-04 DIAGNOSIS — M65.322 TRIGGER FINGER, LEFT INDEX FINGER: ICD-10-CM

## 2025-08-04 DIAGNOSIS — M72.0 DUPUYTREN'S CONTRACTURE OF LEFT HAND: Primary | ICD-10-CM

## 2025-08-04 PROCEDURE — 99024 POSTOP FOLLOW-UP VISIT: CPT | Performed by: ORTHOPAEDIC SURGERY

## (undated) DEVICE — GLOVE SRG BIOGEL 7.5

## (undated) DEVICE — GLOVE INDICATOR PI UNDERGLOVE SZ 8 BLUE

## (undated) DEVICE — SKN PRP WNG SPNGE PVP SCRB STR: Brand: MEDLINE INDUSTRIES, INC.

## (undated) DEVICE — INTENDED FOR TISSUE SEPARATION, AND OTHER PROCEDURES THAT REQUIRE A SHARP SURGICAL BLADE TO PUNCTURE OR CUT.: Brand: BARD-PARKER ® CARBON RIB-BACK BLADES

## (undated) DEVICE — SUT PROLENE 4-0 PC-3 18 IN 8634G

## (undated) DEVICE — STERI DRAPE 1000 NON-STERILE ROLL

## (undated) DEVICE — STERILE BETHLEHEM PLASTIC HAND: Brand: CARDINAL HEALTH

## (undated) DEVICE — ACE WRAP 3 IN UNSTERILE